# Patient Record
Sex: MALE | Race: WHITE | Employment: OTHER | URBAN - METROPOLITAN AREA
[De-identification: names, ages, dates, MRNs, and addresses within clinical notes are randomized per-mention and may not be internally consistent; named-entity substitution may affect disease eponyms.]

---

## 2017-01-23 ENCOUNTER — HOSPITAL ENCOUNTER (OUTPATIENT)
Age: 47
Discharge: HOME | End: 2017-01-23
Payer: COMMERCIAL

## 2017-01-23 VITALS
HEART RATE: 56 BPM | HEIGHT: 76 IN | TEMPERATURE: 98 F | DIASTOLIC BLOOD PRESSURE: 64 MMHG | RESPIRATION RATE: 14 BRPM | WEIGHT: 225 LBS | OXYGEN SATURATION: 100 % | SYSTOLIC BLOOD PRESSURE: 134 MMHG | BODY MASS INDEX: 27.4 KG/M2

## 2017-01-23 DIAGNOSIS — S51.812A FOREARM LACERATION, LEFT, INITIAL ENCOUNTER: Primary | ICD-10-CM

## 2017-01-23 PROCEDURE — 99202 OFFICE O/P NEW SF 15 MIN: CPT

## 2017-01-23 PROCEDURE — 12001 RPR S/N/AX/GEN/TRNK 2.5CM/<: CPT

## 2017-01-23 PROCEDURE — 99213 OFFICE O/P EST LOW 20 MIN: CPT

## 2017-01-23 NOTE — ED PROVIDER NOTES
Patient presents with:  Laceration Abrasion (integumentary)      HPI:     Regino Martinez is a 48year old male presents for a chief complaint of laceration evaluation and repair.   The patient cut his left anterior forearm with a razor blade while at wo FB:  Yes   Normal capillary refill distally:  Yes   Tendon / deep structures in tact:  Yes Against resistance. Anesthetic / Repair:  Copious wound care done. The wound was explored. No foreign body found.   The wound was anesthetized with 1% lidocaine

## 2017-01-31 ENCOUNTER — OFFICE VISIT (OUTPATIENT)
Dept: FAMILY MEDICINE CLINIC | Facility: CLINIC | Age: 47
End: 2017-01-31

## 2017-01-31 VITALS
DIASTOLIC BLOOD PRESSURE: 70 MMHG | HEART RATE: 60 BPM | BODY MASS INDEX: 28 KG/M2 | SYSTOLIC BLOOD PRESSURE: 133 MMHG | WEIGHT: 227 LBS

## 2017-01-31 DIAGNOSIS — S51.812D LACERATION OF FOREARM, LEFT, SUBSEQUENT ENCOUNTER: Primary | ICD-10-CM

## 2017-01-31 PROBLEM — S51.812A LACERATION OF FOREARM, LEFT: Status: ACTIVE | Noted: 2017-01-31

## 2017-01-31 PROCEDURE — 99212 OFFICE O/P EST SF 10 MIN: CPT | Performed by: FAMILY MEDICINE

## 2017-01-31 NOTE — PROGRESS NOTES
Blood pressure 133/70, pulse 60, weight 227 lb (102.967 kg). Following up for left forearm laceration. Occurred with a  at home. Healing well.       OBJECTIVE LEFT FOREARM HEALING LACERATION SUTURES INTACT NO DISCHARGE     ASSESSMENT LEFT Evelyn Champion

## 2017-02-03 ENCOUNTER — OFFICE VISIT (OUTPATIENT)
Dept: FAMILY MEDICINE CLINIC | Facility: CLINIC | Age: 47
End: 2017-02-03

## 2017-02-03 VITALS
SYSTOLIC BLOOD PRESSURE: 132 MMHG | DIASTOLIC BLOOD PRESSURE: 80 MMHG | HEART RATE: 54 BPM | BODY MASS INDEX: 28 KG/M2 | WEIGHT: 230 LBS

## 2017-02-03 DIAGNOSIS — S51.812D FOREARM LACERATION, LEFT, SUBSEQUENT ENCOUNTER: Primary | ICD-10-CM

## 2017-02-03 PROBLEM — S51.819A FOREARM LACERATION: Status: ACTIVE | Noted: 2017-02-03

## 2017-02-03 PROCEDURE — 99212 OFFICE O/P EST SF 10 MIN: CPT | Performed by: FAMILY MEDICINE

## 2017-02-03 PROCEDURE — 99213 OFFICE O/P EST LOW 20 MIN: CPT | Performed by: FAMILY MEDICINE

## 2017-02-03 NOTE — PROGRESS NOTES
Blood pressure 132/80, pulse 54, weight 230 lb (104.327 kg). Following up for suture removal they WERE placed 12 days ago. No pain.         Objective sutures intact wound without signs of secondary infection    Assessment laceration left forearm    Plan s

## 2017-04-05 ENCOUNTER — OFFICE VISIT (OUTPATIENT)
Dept: FAMILY MEDICINE CLINIC | Facility: CLINIC | Age: 47
End: 2017-04-05

## 2017-04-05 VITALS
HEART RATE: 67 BPM | WEIGHT: 223 LBS | BODY MASS INDEX: 27 KG/M2 | SYSTOLIC BLOOD PRESSURE: 125 MMHG | DIASTOLIC BLOOD PRESSURE: 70 MMHG

## 2017-04-05 DIAGNOSIS — B07.8 OTHER VIRAL WARTS: Primary | ICD-10-CM

## 2017-04-05 DIAGNOSIS — B35.4 TINEA CORPORIS: ICD-10-CM

## 2017-04-05 PROBLEM — B07.9 VIRAL WARTS: Status: ACTIVE | Noted: 2017-04-05

## 2017-04-05 PROCEDURE — 99213 OFFICE O/P EST LOW 20 MIN: CPT | Performed by: FAMILY MEDICINE

## 2017-04-05 PROCEDURE — 99212 OFFICE O/P EST SF 10 MIN: CPT | Performed by: FAMILY MEDICINE

## 2017-04-05 NOTE — PROGRESS NOTES
Blood pressure 125/70, pulse 67, weight 223 lb (101.152 kg). Patient presents today complaining of a wart on his cheek. He reports he has had it for 1 week. History of facial warts. Also complaining of a rash on his lower leg.   Objective papule noted l

## 2017-04-12 ENCOUNTER — TELEPHONE (OUTPATIENT)
Dept: FAMILY MEDICINE CLINIC | Facility: CLINIC | Age: 47
End: 2017-04-12

## 2017-04-12 ENCOUNTER — PATIENT MESSAGE (OUTPATIENT)
Dept: FAMILY MEDICINE CLINIC | Facility: CLINIC | Age: 47
End: 2017-04-12

## 2017-04-12 DIAGNOSIS — B07.9 VIRAL WARTS, UNSPECIFIED TYPE: Primary | ICD-10-CM

## 2017-04-12 DIAGNOSIS — L98.9 FACIAL LESION: Primary | ICD-10-CM

## 2017-04-12 NOTE — TELEPHONE ENCOUNTER
From: Grant Devine  To: Charley Key DO  Sent: 4/12/2017 11:51 AM CDT  Subject: Visit Sultana Judge,    The bump / wart on my face that you treated has not reduced in size.  I never saw the whitening then blackening of th

## 2017-04-12 NOTE — TELEPHONE ENCOUNTER
Regarding: Visit Follow-up Question  Contact: [phone]  ----- Message from More Morataya RN sent at 4/12/2017  1:06 PM CDT -----       ----- Message from Sophie Goss to Sohail Cao DO sent at 4/12/2017 11:51 AM -----   Hi Dr. Marimar Mena,

## 2017-04-26 ENCOUNTER — HOSPITAL ENCOUNTER (OUTPATIENT)
Dept: GENERAL RADIOLOGY | Age: 47
Discharge: HOME | End: 2017-04-26
Attending: FAMILY MEDICINE
Payer: COMMERCIAL

## 2017-04-26 ENCOUNTER — OFFICE VISIT (OUTPATIENT)
Dept: FAMILY MEDICINE CLINIC | Facility: CLINIC | Age: 47
End: 2017-04-26

## 2017-04-26 VITALS
WEIGHT: 219 LBS | BODY MASS INDEX: 26.67 KG/M2 | TEMPERATURE: 99 F | HEIGHT: 76 IN | SYSTOLIC BLOOD PRESSURE: 129 MMHG | HEART RATE: 69 BPM | DIASTOLIC BLOOD PRESSURE: 78 MMHG

## 2017-04-26 DIAGNOSIS — M25.361 KNEE INSTABILITY, RIGHT: ICD-10-CM

## 2017-04-26 DIAGNOSIS — M25.361 KNEE INSTABILITY, RIGHT: Primary | ICD-10-CM

## 2017-04-26 PROCEDURE — 99213 OFFICE O/P EST LOW 20 MIN: CPT | Performed by: FAMILY MEDICINE

## 2017-04-26 PROCEDURE — 99212 OFFICE O/P EST SF 10 MIN: CPT | Performed by: FAMILY MEDICINE

## 2017-04-26 PROCEDURE — 73562 X-RAY EXAM OF KNEE 3: CPT

## 2017-04-26 RX ORDER — CLOTRIMAZOLE AND BETAMETHASONE DIPROPIONATE 10; .64 MG/G; MG/G
CREAM TOPICAL
Refills: 1 | COMMUNITY
Start: 2017-04-18 | End: 2017-11-28

## 2017-04-26 NOTE — PROGRESS NOTES
Blood pressure 129/78, pulse 69, temperature 98.5 °F (36.9 °C), temperature source Oral, height 6' 4\" (1.93 m), weight 219 lb (99.338 kg). Patient presents today status post right knee injury 10 days ago playing hockey.   He has a remote history of ACL montano

## 2017-05-15 ENCOUNTER — OFFICE VISIT (OUTPATIENT)
Dept: ORTHOPEDICS CLINIC | Facility: CLINIC | Age: 47
End: 2017-05-15

## 2017-05-15 DIAGNOSIS — M25.561 ACUTE PAIN OF RIGHT KNEE: Primary | ICD-10-CM

## 2017-05-15 PROCEDURE — 99212 OFFICE O/P EST SF 10 MIN: CPT | Performed by: ORTHOPAEDIC SURGERY

## 2017-05-15 PROCEDURE — 99243 OFF/OP CNSLTJ NEW/EST LOW 30: CPT | Performed by: ORTHOPAEDIC SURGERY

## 2017-05-15 NOTE — H&P
Chief Complaint: Right knee pain    NURSING INTAKE COMMENTS: Patient presents with:  Consult: Referred by Dr. Teena Mcdonald. Pt injuries knee right knee playing hockey. Pt did have an ACL surgery to the right knee Partial lateral meniscus. 1997.   Injury appen Past Surgical History    OTHER SURGICAL HISTORY      Comment Nasal polyp removal    OTHER SURGICAL HISTORY      Comment RIGHT ACL/MCL REPAIR      Family History   Problem Relation Age of Onset   • Hypertension Father    • Christa Aschoff Father long-term since his original ACL surgery    Radiographs and Imaging: Reviewed. Prior hardware from the ACL reconstruction is seen. Some mild osteoarthritic changes are noted. No acute abnormality is seen.     Bettyjane Dakin was seen today for consult and knee p

## 2017-11-28 ENCOUNTER — OFFICE VISIT (OUTPATIENT)
Dept: FAMILY MEDICINE CLINIC | Facility: CLINIC | Age: 47
End: 2017-11-28

## 2017-11-28 VITALS
RESPIRATION RATE: 18 BRPM | DIASTOLIC BLOOD PRESSURE: 73 MMHG | HEIGHT: 76 IN | TEMPERATURE: 98 F | WEIGHT: 226 LBS | HEART RATE: 63 BPM | BODY MASS INDEX: 27.52 KG/M2 | SYSTOLIC BLOOD PRESSURE: 120 MMHG

## 2017-11-28 DIAGNOSIS — J06.9 VIRAL UPPER RESPIRATORY TRACT INFECTION: Primary | ICD-10-CM

## 2017-11-28 PROCEDURE — 99213 OFFICE O/P EST LOW 20 MIN: CPT | Performed by: FAMILY MEDICINE

## 2017-11-28 PROCEDURE — 99212 OFFICE O/P EST SF 10 MIN: CPT | Performed by: FAMILY MEDICINE

## 2017-11-29 NOTE — PROGRESS NOTES
Blood pressure 120/73, pulse 63, temperature 97.7 °F (36.5 °C), temperature source Oral, resp. rate 18, height 6' 4\" (1.93 m), weight 226 lb (102.5 kg). Complaining of  cough for the past 3 weeks phlegm is dry to clear.   No nasal congestion no nocturna

## 2017-12-01 ENCOUNTER — OFFICE VISIT (OUTPATIENT)
Dept: FAMILY MEDICINE CLINIC | Facility: CLINIC | Age: 47
End: 2017-12-01

## 2017-12-01 ENCOUNTER — APPOINTMENT (OUTPATIENT)
Dept: LAB | Age: 47
End: 2017-12-01
Attending: FAMILY MEDICINE
Payer: COMMERCIAL

## 2017-12-01 VITALS
SYSTOLIC BLOOD PRESSURE: 134 MMHG | BODY MASS INDEX: 27.28 KG/M2 | DIASTOLIC BLOOD PRESSURE: 75 MMHG | TEMPERATURE: 98 F | WEIGHT: 224 LBS | HEIGHT: 76 IN | RESPIRATION RATE: 18 BRPM | HEART RATE: 65 BPM

## 2017-12-01 DIAGNOSIS — N48.9 PENILE LESION: ICD-10-CM

## 2017-12-01 DIAGNOSIS — N48.9 PENILE LESION: Primary | ICD-10-CM

## 2017-12-01 PROCEDURE — 86803 HEPATITIS C AB TEST: CPT

## 2017-12-01 PROCEDURE — 99213 OFFICE O/P EST LOW 20 MIN: CPT | Performed by: FAMILY MEDICINE

## 2017-12-01 PROCEDURE — 87086 URINE CULTURE/COLONY COUNT: CPT

## 2017-12-01 PROCEDURE — 99212 OFFICE O/P EST SF 10 MIN: CPT | Performed by: FAMILY MEDICINE

## 2017-12-01 PROCEDURE — 87591 N.GONORRHOEAE DNA AMP PROB: CPT

## 2017-12-01 PROCEDURE — 87491 CHLMYD TRACH DNA AMP PROBE: CPT

## 2017-12-01 PROCEDURE — 36415 COLL VENOUS BLD VENIPUNCTURE: CPT

## 2017-12-01 PROCEDURE — 86706 HEP B SURFACE ANTIBODY: CPT

## 2017-12-01 PROCEDURE — 80500 HEPATITIS A B + C PROFILE: CPT

## 2017-12-01 PROCEDURE — 86708 HEPATITIS A ANTIBODY: CPT

## 2017-12-01 PROCEDURE — 81003 URINALYSIS AUTO W/O SCOPE: CPT

## 2017-12-01 PROCEDURE — 86780 TREPONEMA PALLIDUM: CPT

## 2017-12-01 PROCEDURE — 87340 HEPATITIS B SURFACE AG IA: CPT

## 2017-12-01 PROCEDURE — 86704 HEP B CORE ANTIBODY TOTAL: CPT

## 2017-12-05 ENCOUNTER — TELEPHONE (OUTPATIENT)
Dept: FAMILY MEDICINE CLINIC | Facility: CLINIC | Age: 47
End: 2017-12-05

## 2017-12-05 NOTE — TELEPHONE ENCOUNTER
, Pt is returning back you call. Please advise. Notes Recorded by Angela Webber DO on 12/5/2017 at 2:57 PM CST  Called regarding results lmtcb.

## 2017-12-06 NOTE — TELEPHONE ENCOUNTER
CALLED AND REVIEWED RESULTS OF HERPES SWAB ADVISED PATIENT OF NEGATIVE RESULTS BUT CLINICALLY PATIENT LIKELY TO HAVE HERPES ADVISED INFORMING ANY POTENTIAL PARTNERS AND DISCUSSED OTHER STI RESULTS.

## 2018-01-03 ENCOUNTER — OFFICE VISIT (OUTPATIENT)
Dept: FAMILY MEDICINE CLINIC | Facility: CLINIC | Age: 48
End: 2018-01-03

## 2018-01-03 ENCOUNTER — LAB ENCOUNTER (OUTPATIENT)
Dept: LAB | Age: 48
End: 2018-01-03
Attending: FAMILY MEDICINE
Payer: COMMERCIAL

## 2018-01-03 VITALS
DIASTOLIC BLOOD PRESSURE: 76 MMHG | BODY MASS INDEX: 28.15 KG/M2 | SYSTOLIC BLOOD PRESSURE: 117 MMHG | WEIGHT: 231.13 LBS | HEART RATE: 80 BPM | HEIGHT: 76 IN

## 2018-01-03 DIAGNOSIS — A51.0 CHANCRE: ICD-10-CM

## 2018-01-03 DIAGNOSIS — A51.0 CHANCRE: Primary | ICD-10-CM

## 2018-01-03 PROCEDURE — 86780 TREPONEMA PALLIDUM: CPT

## 2018-01-03 PROCEDURE — 86593 SYPHILIS TEST NON-TREP QUANT: CPT

## 2018-01-03 PROCEDURE — 36415 COLL VENOUS BLD VENIPUNCTURE: CPT

## 2018-01-03 PROCEDURE — 96372 THER/PROPH/DIAG INJ SC/IM: CPT | Performed by: FAMILY MEDICINE

## 2018-01-03 PROCEDURE — 99214 OFFICE O/P EST MOD 30 MIN: CPT | Performed by: FAMILY MEDICINE

## 2018-01-03 PROCEDURE — 86592 SYPHILIS TEST NON-TREP QUAL: CPT

## 2018-01-03 NOTE — PROGRESS NOTES
Blood pressure 117/76, pulse 80, height 6' 4\" (1.93 m), weight 231 lb 1.6 oz (104.8 kg). PRESENTS TODAY FOLLOWING UP FOR PAINLESS PENILE LESION PRESENT NEAR HIS URETHRA. HISTORY OF POSITIVE HSV 1 BLOOD TEST YEARS AGO. LESION NOW PRESENT FOR 5 WEEKS.

## 2018-01-05 ENCOUNTER — TELEPHONE (OUTPATIENT)
Dept: FAMILY MEDICINE CLINIC | Facility: CLINIC | Age: 48
End: 2018-01-05

## 2018-01-05 LAB — T PALLIDUM AB SER QL: POSITIVE

## 2018-01-05 NOTE — TELEPHONE ENCOUNTER
CALLED AND DISCUSSED TPAL RESULTS PATIENT ADMITS TO SAME SEX ACTIVITY NO DRUGS OR PROSTITUTES. RASH HAS NOT WORSENED SINCE VISIT.

## 2018-01-07 LAB — RAPID PLASMA REAGIN (RPR): REACTIVE

## 2018-01-09 ENCOUNTER — TELEPHONE (OUTPATIENT)
Dept: OTHER | Age: 48
End: 2018-01-09

## 2018-01-09 NOTE — TELEPHONE ENCOUNTER
To  [facility],   Please see message below and advise.      Please respond to pool: EM FM LMB LPN/CMA    Patient called back and states that he is returning a call from last night call of Dr Salomón Gonzalez, advised that MD send MyChart message as well last night and

## 2018-02-20 ENCOUNTER — OFFICE VISIT (OUTPATIENT)
Dept: FAMILY MEDICINE CLINIC | Facility: CLINIC | Age: 48
End: 2018-02-20

## 2018-02-20 VITALS
HEART RATE: 71 BPM | WEIGHT: 226 LBS | DIASTOLIC BLOOD PRESSURE: 77 MMHG | HEIGHT: 76 IN | SYSTOLIC BLOOD PRESSURE: 151 MMHG | BODY MASS INDEX: 27.52 KG/M2

## 2018-02-20 DIAGNOSIS — N48.9 PENILE LESION: Primary | ICD-10-CM

## 2018-02-20 DIAGNOSIS — R35.1 NOCTURIA: ICD-10-CM

## 2018-02-20 PROCEDURE — 99213 OFFICE O/P EST LOW 20 MIN: CPT | Performed by: FAMILY MEDICINE

## 2018-02-20 PROCEDURE — 99212 OFFICE O/P EST SF 10 MIN: CPT | Performed by: FAMILY MEDICINE

## 2018-02-20 RX ORDER — KETOCONAZOLE 20 MG/G
1 CREAM TOPICAL DAILY
Qty: 1 TUBE | Refills: 1 | Status: SHIPPED | OUTPATIENT
Start: 2018-02-20 | End: 2018-05-15

## 2018-02-20 NOTE — PROGRESS NOTES
Blood pressure 151/77, pulse 71, height 6' 4\" (1.93 m), weight 226 lb (102.5 kg). Patient has a penile lesion that he has not noticed for 2 weeks. Received oral sex the night before he noticed the lesion. It is tender but not painful.   Denies dischar

## 2018-02-22 ENCOUNTER — LAB ENCOUNTER (OUTPATIENT)
Dept: LAB | Age: 48
End: 2018-02-22
Attending: FAMILY MEDICINE
Payer: COMMERCIAL

## 2018-02-22 ENCOUNTER — OFFICE VISIT (OUTPATIENT)
Dept: PODIATRY CLINIC | Facility: CLINIC | Age: 48
End: 2018-02-22

## 2018-02-22 DIAGNOSIS — N48.9 PENILE LESION: Primary | ICD-10-CM

## 2018-02-22 DIAGNOSIS — M77.42 METATARSALGIA OF LEFT FOOT: Primary | ICD-10-CM

## 2018-02-22 DIAGNOSIS — R35.1 NOCTURIA: ICD-10-CM

## 2018-02-22 LAB
CHOLEST SERPL-MCNC: 150 MG/DL (ref 110–200)
GLUCOSE SERPL-MCNC: 100 MG/DL (ref 70–99)
HDLC SERPL-MCNC: 43 MG/DL
LDLC SERPL CALC-MCNC: 95 MG/DL (ref 0–99)
NONHDLC SERPL-MCNC: 107 MG/DL
PSA SERPL-MCNC: 1 NG/ML (ref 0–4)
TRIGL SERPL-MCNC: 62 MG/DL (ref 1–149)

## 2018-02-22 PROCEDURE — 80061 LIPID PANEL: CPT

## 2018-02-22 PROCEDURE — 99213 OFFICE O/P EST LOW 20 MIN: CPT | Performed by: PODIATRIST

## 2018-02-22 PROCEDURE — 86592 SYPHILIS TEST NON-TREP QUAL: CPT

## 2018-02-22 PROCEDURE — 86593 SYPHILIS TEST NON-TREP QUANT: CPT

## 2018-02-22 PROCEDURE — 99212 OFFICE O/P EST SF 10 MIN: CPT | Performed by: PODIATRIST

## 2018-02-22 PROCEDURE — 86780 TREPONEMA PALLIDUM: CPT

## 2018-02-22 PROCEDURE — 82947 ASSAY GLUCOSE BLOOD QUANT: CPT

## 2018-02-22 PROCEDURE — 36415 COLL VENOUS BLD VENIPUNCTURE: CPT

## 2018-02-22 NOTE — PROGRESS NOTES
HPI:    Patient ID: Mallory Evans is a 46year old male. HPI  This pleasant 78-year-old male presents as a new patient to me.   He was a patient of Dr. Darlene Izquierdo in the past.  Patient has been wearing an orthotic device for the last couple of years be specific osseous pathology. There is no noted significant prominence of the metatarsal phalangeal joint. I instructed this patient that I did not correlate this left forefoot symptoms with heels. He has never had heel pain nor has he ever had arch pain.

## 2018-02-23 LAB — T PALLIDUM AB SER QL: POSITIVE

## 2018-02-25 LAB — RAPID PLASMA REAGIN (RPR): REACTIVE

## 2018-03-22 ENCOUNTER — OFFICE VISIT (OUTPATIENT)
Dept: SURGERY | Facility: CLINIC | Age: 48
End: 2018-03-22

## 2018-03-22 VITALS
HEART RATE: 71 BPM | BODY MASS INDEX: 27.4 KG/M2 | DIASTOLIC BLOOD PRESSURE: 87 MMHG | WEIGHT: 225 LBS | TEMPERATURE: 98 F | SYSTOLIC BLOOD PRESSURE: 148 MMHG | HEIGHT: 76 IN

## 2018-03-22 DIAGNOSIS — N48.9 PENILE LESION: Primary | ICD-10-CM

## 2018-03-22 PROCEDURE — 99212 OFFICE O/P EST SF 10 MIN: CPT | Performed by: UROLOGY

## 2018-03-22 PROCEDURE — 99244 OFF/OP CNSLTJ NEW/EST MOD 40: CPT | Performed by: UROLOGY

## 2018-03-22 RX ORDER — DOXYCYCLINE 100 MG/1
100 TABLET ORAL 2 TIMES DAILY
Qty: 28 TABLET | Refills: 0 | Status: SHIPPED | OUTPATIENT
Start: 2018-03-22 | End: 2018-04-05

## 2018-03-22 NOTE — PROGRESS NOTES
SUBJECTIVE:  Sandrita Arzola is a 46year old male who presents for a consultation at the request of, and a copy of this note will be sent to, Dr. Mu Smith, for evaluation of  Penile lesion. He states that the problem is unchanged.  Symptoms inclu Jany Client Mother      Lymphoma   • Colon Cancer Other      Close relative      Social History: Smoking status: Never Smoker                                                              Smokeless tobacco: Never Used                      Alcohol use:  Yes lesion  (primary encounter diagnosis)    No orders of the defined types were placed in this encounter. Reviewed findings with patient.   No signs of latent or tertiary syphilis can be seen in physical exam if there is no other skin lesions adenopathy or ot

## 2018-03-29 ENCOUNTER — TELEPHONE (OUTPATIENT)
Dept: PODIATRY CLINIC | Facility: CLINIC | Age: 48
End: 2018-03-29

## 2018-03-29 ENCOUNTER — OFFICE VISIT (OUTPATIENT)
Dept: PODIATRY CLINIC | Facility: CLINIC | Age: 48
End: 2018-03-29

## 2018-03-29 DIAGNOSIS — M77.42 METATARSALGIA OF LEFT FOOT: Primary | ICD-10-CM

## 2018-03-29 PROCEDURE — 29799 UNLISTED PX CASTING/STRPG: CPT | Performed by: PODIATRIST

## 2018-03-29 PROCEDURE — 99213 OFFICE O/P EST LOW 20 MIN: CPT | Performed by: PODIATRIST

## 2018-03-29 PROCEDURE — L3000 FT INSERT UCB BERKELEY SHELL: HCPCS | Performed by: PODIATRIST

## 2018-03-29 PROCEDURE — 99212 OFFICE O/P EST SF 10 MIN: CPT | Performed by: PODIATRIST

## 2018-03-29 NOTE — TELEPHONE ENCOUNTER
Impression taken today for custom orthotics. Orthotics picked up at [facility] on 5/15/18.  Lisa Curry

## 2018-03-29 NOTE — PROGRESS NOTES
HPI:    Patient ID: Gertrude Ling is a 46year old male. HPI  This 78-year-old male presents for long-term orthotic control. Approval has been received for this treatment.   Review of Systems         Current Outpatient Prescriptions:  Doxycycline M

## 2018-04-06 ENCOUNTER — LAB ENCOUNTER (OUTPATIENT)
Dept: LAB | Age: 48
End: 2018-04-06
Attending: UROLOGY
Payer: COMMERCIAL

## 2018-04-06 DIAGNOSIS — N48.9 PENILE LESION: ICD-10-CM

## 2018-04-06 PROCEDURE — 36415 COLL VENOUS BLD VENIPUNCTURE: CPT

## 2018-04-06 PROCEDURE — 86592 SYPHILIS TEST NON-TREP QUAL: CPT

## 2018-04-06 PROCEDURE — 86593 SYPHILIS TEST NON-TREP QUANT: CPT

## 2018-04-06 PROCEDURE — 86780 TREPONEMA PALLIDUM: CPT

## 2018-04-12 ENCOUNTER — OFFICE VISIT (OUTPATIENT)
Dept: SURGERY | Facility: CLINIC | Age: 48
End: 2018-04-12

## 2018-04-12 VITALS
SYSTOLIC BLOOD PRESSURE: 156 MMHG | RESPIRATION RATE: 16 BRPM | BODY MASS INDEX: 26.79 KG/M2 | HEART RATE: 64 BPM | DIASTOLIC BLOOD PRESSURE: 86 MMHG | HEIGHT: 76 IN | TEMPERATURE: 98 F | WEIGHT: 220 LBS

## 2018-04-12 DIAGNOSIS — N48.9 PENILE LESION: Primary | ICD-10-CM

## 2018-04-12 PROCEDURE — 99213 OFFICE O/P EST LOW 20 MIN: CPT | Performed by: UROLOGY

## 2018-04-12 PROCEDURE — 99212 OFFICE O/P EST SF 10 MIN: CPT | Performed by: UROLOGY

## 2018-04-13 NOTE — PROGRESS NOTES
Sophie Goss is a 46year old male. HPI:   No chief complaint on file. 49-year-old male in follow-up to a visit March 22, 2018.   He has a history of syphilis for which he was appropriately treated with intramuscular Bicillin by his primary ca Smoking status: Never Smoker                                                              Smokeless tobacco: Never Used                      Alcohol use: Yes           6.0 oz/week     Standard drinks or equivalent: 10 per week     Comment: occasional

## 2018-04-20 ENCOUNTER — TELEPHONE (OUTPATIENT)
Dept: ORTHOPEDICS CLINIC | Facility: CLINIC | Age: 48
End: 2018-04-20

## 2018-05-15 ENCOUNTER — OFFICE VISIT (OUTPATIENT)
Dept: PODIATRY CLINIC | Facility: CLINIC | Age: 48
End: 2018-05-15

## 2018-05-15 DIAGNOSIS — M77.42 METATARSALGIA OF LEFT FOOT: Primary | ICD-10-CM

## 2018-05-15 NOTE — PROGRESS NOTES
HPI:    Patient ID: Anthony Garnett is a 46year old male. HPI  This 75-year-old male presents for dispensing of orthoses. They appear to be of proper fit. I do not see reason for break-in.   Based on the fact that he has had a device in the past.

## 2018-06-19 ENCOUNTER — OFFICE VISIT (OUTPATIENT)
Dept: PODIATRY CLINIC | Facility: CLINIC | Age: 48
End: 2018-06-19

## 2018-06-19 DIAGNOSIS — M77.42 METATARSALGIA OF LEFT FOOT: ICD-10-CM

## 2018-06-19 DIAGNOSIS — B35.1 ONYCHOMYCOSIS: Primary | ICD-10-CM

## 2018-06-19 PROCEDURE — 99212 OFFICE O/P EST SF 10 MIN: CPT | Performed by: PODIATRIST

## 2018-06-19 PROCEDURE — 99213 OFFICE O/P EST LOW 20 MIN: CPT | Performed by: PODIATRIST

## 2018-06-19 NOTE — PROGRESS NOTES
HPI:    Patient ID: Brendan Luke is a 46year old male. HPI  This 61-year-old male presents primarily for reevaluation in reference to orthotic use.   The new orthotic devices are functioning very well and is extremely happy and pleased with the wa & Referrals:  None       #[id number]

## 2018-07-12 ENCOUNTER — APPOINTMENT (OUTPATIENT)
Dept: LAB | Age: 48
End: 2018-07-12
Attending: PODIATRIST
Payer: COMMERCIAL

## 2018-07-12 ENCOUNTER — OFFICE VISIT (OUTPATIENT)
Dept: PODIATRY CLINIC | Facility: CLINIC | Age: 48
End: 2018-07-12

## 2018-07-12 DIAGNOSIS — B35.1 ONYCHOMYCOSIS: Primary | ICD-10-CM

## 2018-07-12 DIAGNOSIS — B35.1 ONYCHOMYCOSIS: ICD-10-CM

## 2018-07-12 LAB
ALBUMIN SERPL BCP-MCNC: 3.8 G/DL (ref 3.5–4.8)
ALP SERPL-CCNC: 50 U/L (ref 32–100)
ALT SERPL-CCNC: 25 U/L (ref 17–63)
AST SERPL-CCNC: 28 U/L (ref 15–41)
BILIRUB DIRECT SERPL-MCNC: 0.1 MG/DL (ref 0–0.2)
BILIRUB SERPL-MCNC: 0.8 MG/DL (ref 0.3–1.2)
PROT SERPL-MCNC: 6.8 G/DL (ref 5.9–8.4)

## 2018-07-12 PROCEDURE — 99212 OFFICE O/P EST SF 10 MIN: CPT | Performed by: PODIATRIST

## 2018-07-12 PROCEDURE — 80076 HEPATIC FUNCTION PANEL: CPT

## 2018-07-12 PROCEDURE — 36415 COLL VENOUS BLD VENIPUNCTURE: CPT

## 2018-07-12 NOTE — PROGRESS NOTES
HPI:    Patient ID: Arnel Hernandez is a 46year old male. HPI  This 57-year-old male presents to review fungal culture and decide on treatment. Review of Systems  I reviewed medical status and medications were noted. He has no known allergies.

## 2018-07-13 ENCOUNTER — TELEPHONE (OUTPATIENT)
Dept: PODIATRY CLINIC | Facility: CLINIC | Age: 48
End: 2018-07-13

## 2018-07-13 RX ORDER — TERBINAFINE HYDROCHLORIDE 250 MG/1
250 TABLET ORAL DAILY
Qty: 90 TABLET | Refills: 0 | Status: SHIPPED | OUTPATIENT
Start: 2018-07-13 | End: 2019-08-12 | Stop reason: ALTCHOICE

## 2018-07-13 NOTE — TELEPHONE ENCOUNTER
----- Message from Fabricio Coronado DPM sent at 7/13/2018  9:55 AM CDT -----  1. Call patient  2. Labs wnl  3. Rx Lamisil 250 mg tabs             #90         Sig 1 tab daily       Generic ok nrf  4.  Follow up 2 months

## 2018-07-13 NOTE — TELEPHONE ENCOUNTER
Called pt and informed him of SCR orders/message. F/u appt made on 9/6/18 @ 140pm at [facility]. Pharm confirmed.

## 2018-08-07 ENCOUNTER — LAB ENCOUNTER (OUTPATIENT)
Dept: LAB | Age: 48
End: 2018-08-07
Attending: UROLOGY
Payer: COMMERCIAL

## 2018-08-07 DIAGNOSIS — N48.9 PENILE LESION: ICD-10-CM

## 2018-08-07 PROCEDURE — 86780 TREPONEMA PALLIDUM: CPT

## 2018-08-07 PROCEDURE — 36415 COLL VENOUS BLD VENIPUNCTURE: CPT

## 2018-08-07 PROCEDURE — 86593 SYPHILIS TEST NON-TREP QUANT: CPT

## 2018-08-07 PROCEDURE — 86592 SYPHILIS TEST NON-TREP QUAL: CPT

## 2018-08-08 LAB — T PALLIDUM AB SER QL: POSITIVE

## 2018-08-10 LAB — RAPID PLASMA REAGIN (RPR): REACTIVE

## 2018-08-22 ENCOUNTER — TELEPHONE (OUTPATIENT)
Dept: SURGERY | Facility: CLINIC | Age: 48
End: 2018-08-22

## 2018-08-22 NOTE — TELEPHONE ENCOUNTER
Patient contacted. Patient is aware of his test results and verbalized understanding. Number to [facility] Infectious disease given to patient. Patient denies any syphilis symptoms.

## 2018-08-22 NOTE — TELEPHONE ENCOUNTER
----- Message from Ziggy Salazar MD sent at 8/21/2018  3:48 PM CDT -----  Staff please call this patient. Informed him that I have reviewed his recent blood tests.   He continues to have a 1: 2 RPR titer even though my suspicion was he would seroconvert a

## 2018-09-06 ENCOUNTER — OFFICE VISIT (OUTPATIENT)
Dept: PODIATRY CLINIC | Facility: CLINIC | Age: 48
End: 2018-09-06
Payer: COMMERCIAL

## 2018-09-06 DIAGNOSIS — B35.1 ONYCHOMYCOSIS: Primary | ICD-10-CM

## 2018-09-06 PROCEDURE — 99212 OFFICE O/P EST SF 10 MIN: CPT | Performed by: PODIATRIST

## 2018-09-06 NOTE — PROGRESS NOTES
HPI:    Patient ID: Anaya Galvez is a 46year old male. HPI  This 49-year-old male presents for follow-up in reference to treating onychomycosis. Patient has completed 60 tablets of oral Lamisil with no apparent side effects.   Nail changes noted

## 2018-11-06 ENCOUNTER — HOSPITAL ENCOUNTER (OUTPATIENT)
Dept: GENERAL RADIOLOGY | Age: 48
Discharge: HOME | End: 2018-11-06
Attending: FAMILY MEDICINE
Payer: COMMERCIAL

## 2018-11-06 ENCOUNTER — OFFICE VISIT (OUTPATIENT)
Dept: FAMILY MEDICINE CLINIC | Facility: CLINIC | Age: 48
End: 2018-11-06
Payer: COMMERCIAL

## 2018-11-06 VITALS
RESPIRATION RATE: 20 BRPM | SYSTOLIC BLOOD PRESSURE: 134 MMHG | DIASTOLIC BLOOD PRESSURE: 83 MMHG | BODY MASS INDEX: 27.16 KG/M2 | WEIGHT: 223 LBS | HEART RATE: 76 BPM | HEIGHT: 76 IN

## 2018-11-06 DIAGNOSIS — M54.12 CERVICAL RADICULOPATHY: ICD-10-CM

## 2018-11-06 DIAGNOSIS — Z12.11 ENCOUNTER FOR SCREENING COLONOSCOPY: Primary | ICD-10-CM

## 2018-11-06 PROCEDURE — 99212 OFFICE O/P EST SF 10 MIN: CPT | Performed by: FAMILY MEDICINE

## 2018-11-06 PROCEDURE — 72040 X-RAY EXAM NECK SPINE 2-3 VW: CPT | Performed by: FAMILY MEDICINE

## 2018-11-06 PROCEDURE — 99213 OFFICE O/P EST LOW 20 MIN: CPT | Performed by: FAMILY MEDICINE

## 2018-11-06 RX ORDER — TADALAFIL 20 MG/1
20 TABLET ORAL
Qty: 36 TABLET | Refills: 3 | Status: SHIPPED | OUTPATIENT
Start: 2018-11-06

## 2018-11-06 RX ORDER — METHYLPREDNISOLONE 4 MG/1
TABLET ORAL
Qty: 1 KIT | Refills: 1 | Status: SHIPPED | OUTPATIENT
Start: 2018-11-06 | End: 2019-08-12 | Stop reason: ALTCHOICE

## 2018-11-06 NOTE — PROGRESS NOTES
Blood pressure 134/83, pulse 76, resp. rate 20, height 6' 4\" (1.93 m), weight 223 lb (101.2 kg). Patient presents today complaining of discomfort down the left arm for the past month. No traumatic injury he lifts weights and plays hockey.   He has a li

## 2018-11-13 ENCOUNTER — OFFICE VISIT (OUTPATIENT)
Dept: PHYSICAL THERAPY | Age: 48
End: 2018-11-13
Attending: FAMILY MEDICINE
Payer: COMMERCIAL

## 2018-11-13 DIAGNOSIS — M54.12 CERVICAL RADICULOPATHY: ICD-10-CM

## 2018-11-13 PROCEDURE — 97161 PT EVAL LOW COMPLEX 20 MIN: CPT | Performed by: PHYSICAL THERAPIST

## 2018-11-13 PROCEDURE — 97530 THERAPEUTIC ACTIVITIES: CPT | Performed by: PHYSICAL THERAPIST

## 2018-11-13 NOTE — PROGRESS NOTES
CERVICAL SPINE EVALUATION:   Referring Physician: Nik Plunkett DO    Diagnosis: Cervical radiculopathy (T10.17) Evaluation date: 11/13/2018   Date of Onset: October 2018  Visit # 1  Scheduled Visits 8  Insurance Authorized visits 10 [address]  Seated Retraction with self OP      Shoulder AROM: all WFL     Strength UE:   -/5    R  L   Shoulder flex (C4)    5/5   5/5   Shoulder ext     Abduction (C5) 5/5 5/5   ER 5/5 5/5   IR 5/5 5/5   Biceps (C6) 5/5 5/5   Wrist ext (C6)  5/5   Triceps (C7) 5 please contact me at Dept: [phone].     Sincerely,  Electronically signed by therapist: Jose C Gallagher PT, DPT, cert MDT      Physician’s certification required: Yes  I certify the need for these services furnished under this plan of treatment and

## 2018-11-15 ENCOUNTER — OFFICE VISIT (OUTPATIENT)
Dept: PHYSICAL THERAPY | Age: 48
End: 2018-11-15
Attending: FAMILY MEDICINE
Payer: COMMERCIAL

## 2018-11-15 PROCEDURE — 97110 THERAPEUTIC EXERCISES: CPT | Performed by: PHYSICAL THERAPIST

## 2018-11-15 NOTE — PROGRESS NOTES
Dx: Cervical radiculopathy (M54.12)           Visit # 2  Fall Risk: standard     Scheduled Visits 8  Precautions: n/a   Insurance Authorized visits      10 PPO    Next MD visit: none scheduled  Evaluation Date 11/13/18  Medication Changes since last visit?

## 2018-11-19 ENCOUNTER — OFFICE VISIT (OUTPATIENT)
Dept: PHYSICAL THERAPY | Age: 48
End: 2018-11-19
Attending: FAMILY MEDICINE
Payer: COMMERCIAL

## 2018-11-19 PROCEDURE — 97110 THERAPEUTIC EXERCISES: CPT

## 2018-11-19 NOTE — PROGRESS NOTES
Dx: Cervical radiculopathy (M54.12)           Visit # 3  Fall Risk: standard     Scheduled Visits 8  Precautions: n/a   Insurance Authorized visits      10 PPO    Next MD visit: none scheduled  Evaluation Date 11/13/18  Medication Changes since last visit? drive > 20 minutes with < 2/10 pain.   3.Pt to demonstrate [facility] C-spine AROM in order to turn head to drive-almost met            Plan: Cont PT per plan of care    Charges: 3 therex       Total Timed Treatment: 40 min  Total Treatment Time: 45 min

## 2018-11-20 ENCOUNTER — APPOINTMENT (OUTPATIENT)
Dept: PHYSICAL THERAPY | Age: 48
End: 2018-11-20
Attending: FAMILY MEDICINE
Payer: COMMERCIAL

## 2018-11-26 ENCOUNTER — APPOINTMENT (OUTPATIENT)
Dept: PHYSICAL THERAPY | Age: 48
End: 2018-11-26
Attending: FAMILY MEDICINE
Payer: COMMERCIAL

## 2018-11-29 ENCOUNTER — APPOINTMENT (OUTPATIENT)
Dept: PHYSICAL THERAPY | Age: 48
End: 2018-11-29
Attending: FAMILY MEDICINE
Payer: COMMERCIAL

## 2018-12-04 ENCOUNTER — APPOINTMENT (OUTPATIENT)
Dept: PHYSICAL THERAPY | Age: 48
End: 2018-12-04
Attending: FAMILY MEDICINE
Payer: COMMERCIAL

## 2018-12-05 NOTE — PROGRESS NOTES
Patient Name: Linda Jackson, : 1970, MRN: [id number]   Date:  2018  Referring Physician:  Bridget Shipman    Diagnosis: Cervical radiculopathy (M54.12)         Discharge note    Pt has attended 3 visits in Physical Therapy.      Feliciano

## 2018-12-06 ENCOUNTER — OFFICE VISIT (OUTPATIENT)
Dept: PODIATRY CLINIC | Facility: CLINIC | Age: 48
End: 2018-12-06
Payer: COMMERCIAL

## 2018-12-06 DIAGNOSIS — B35.1 ONYCHOMYCOSIS: Primary | ICD-10-CM

## 2018-12-06 PROCEDURE — 99212 OFFICE O/P EST SF 10 MIN: CPT | Performed by: PODIATRIST

## 2018-12-06 NOTE — PROGRESS NOTES
HPI:    Patient ID: Sophie Goss is a 46year old male. 24-year-old male presents for follow-up in reference to fungus toenail concerns. He finished 90 tablets of oral Lamisil 2 months ago. He reports no ill effects with the medication.     ROS:

## 2018-12-07 ENCOUNTER — APPOINTMENT (OUTPATIENT)
Dept: PHYSICAL THERAPY | Age: 48
End: 2018-12-07
Attending: FAMILY MEDICINE
Payer: COMMERCIAL

## 2018-12-11 ENCOUNTER — APPOINTMENT (OUTPATIENT)
Dept: PHYSICAL THERAPY | Age: 48
End: 2018-12-11
Attending: FAMILY MEDICINE
Payer: COMMERCIAL

## 2018-12-14 ENCOUNTER — APPOINTMENT (OUTPATIENT)
Dept: PHYSICAL THERAPY | Age: 48
End: 2018-12-14
Attending: FAMILY MEDICINE
Payer: COMMERCIAL

## 2018-12-18 ENCOUNTER — APPOINTMENT (OUTPATIENT)
Dept: PHYSICAL THERAPY | Age: 48
End: 2018-12-18
Attending: FAMILY MEDICINE
Payer: COMMERCIAL

## 2018-12-21 ENCOUNTER — APPOINTMENT (OUTPATIENT)
Dept: PHYSICAL THERAPY | Age: 48
End: 2018-12-21
Attending: FAMILY MEDICINE
Payer: COMMERCIAL

## 2019-08-12 ENCOUNTER — NURSE ONLY (OUTPATIENT)
Dept: GASTROENTEROLOGY | Facility: CLINIC | Age: 49
End: 2019-08-12

## 2019-08-12 DIAGNOSIS — Z80.0 FAMILY HISTORY OF COLON CANCER: ICD-10-CM

## 2019-08-12 DIAGNOSIS — Z12.11 COLON CANCER SCREENING: Primary | ICD-10-CM

## 2019-08-12 NOTE — PROGRESS NOTES
Pt had an OV with Dr Caleb Kinsey on 11/06/18. Visit was for a screening colonoscopy. B/P 134/83    6'4\"/223/27.16    Positive for hemorrhoids in 2009.  He did not use any medication for the hemorrhoids and they do not bother him now    No GI symptoms    Pt

## 2019-08-14 RX ORDER — POLYETHYLENE GLYCOL 3350, SODIUM CHLORIDE, SODIUM BICARBONATE, POTASSIUM CHLORIDE 420; 11.2; 5.72; 1.48 G/4L; G/4L; G/4L; G/4L
POWDER, FOR SOLUTION ORAL
Qty: 1 BOTTLE | Refills: 0 | Status: SHIPPED | OUTPATIENT
Start: 2019-08-14 | End: 2020-09-01 | Stop reason: ALTCHOICE

## 2019-08-14 NOTE — PROGRESS NOTES
44year old male patient of Dr. Jovan Franco with PMHx of hemorrhoids (2009 - denies current sxs), (+) family history of colon cancer (maternal grandmother - age 80), erectile dysfunction - no current GI complaints.  I do note that the patient also has a

## 2019-08-14 NOTE — PROGRESS NOTES
Scheduled for:  Colonoscopy - [address]7719  Provider Name:  Dr. Ike Dickinson  Date:  10/2/19  [facility]  Sedation:  MAC  Time:  1:30 pm (pt is aware to arrive at 12:30 pm)  Prep:  Trilyte, Prep instructions were given to pt in the office, pt verbalized under

## 2019-10-02 ENCOUNTER — ANESTHESIA EVENT (OUTPATIENT)
Dept: ENDOSCOPY | Age: 49
End: 2019-10-02
Payer: COMMERCIAL

## 2019-10-02 ENCOUNTER — HOSPITAL ENCOUNTER (OUTPATIENT)
Age: 49
Setting detail: HOSPITAL OUTPATIENT SURGERY
Discharge: HOME | End: 2019-10-02
Attending: INTERNAL MEDICINE | Admitting: INTERNAL MEDICINE
Payer: COMMERCIAL

## 2019-10-02 ENCOUNTER — ANESTHESIA (OUTPATIENT)
Dept: ENDOSCOPY | Age: 49
End: 2019-10-02
Payer: COMMERCIAL

## 2019-10-02 VITALS
BODY MASS INDEX: 26.79 KG/M2 | SYSTOLIC BLOOD PRESSURE: 131 MMHG | DIASTOLIC BLOOD PRESSURE: 76 MMHG | HEART RATE: 60 BPM | TEMPERATURE: 98 F | HEIGHT: 76 IN | WEIGHT: 220 LBS | RESPIRATION RATE: 12 BRPM | OXYGEN SATURATION: 100 %

## 2019-10-02 DIAGNOSIS — Z12.11 COLON CANCER SCREENING: ICD-10-CM

## 2019-10-02 DIAGNOSIS — Z80.0 FAMILY HISTORY OF COLON CANCER: ICD-10-CM

## 2019-10-02 PROCEDURE — 45378 DIAGNOSTIC COLONOSCOPY: CPT | Performed by: INTERNAL MEDICINE

## 2019-10-02 RX ORDER — SODIUM CHLORIDE, SODIUM LACTATE, POTASSIUM CHLORIDE, CALCIUM CHLORIDE 600; 310; 30; 20 MG/100ML; MG/100ML; MG/100ML; MG/100ML
INJECTION, SOLUTION INTRAVENOUS CONTINUOUS
Status: DISCONTINUED | OUTPATIENT
Start: 2019-10-02 | End: 2019-10-02

## 2019-10-02 RX ORDER — MIDAZOLAM HYDROCHLORIDE 1 MG/ML
INJECTION INTRAMUSCULAR; INTRAVENOUS AS NEEDED
Status: DISCONTINUED | OUTPATIENT
Start: 2019-10-02 | End: 2019-10-02 | Stop reason: SURG

## 2019-10-02 RX ADMIN — SODIUM CHLORIDE, SODIUM LACTATE, POTASSIUM CHLORIDE, CALCIUM CHLORIDE: 600; 310; 30; 20 INJECTION, SOLUTION INTRAVENOUS at 13:45:00

## 2019-10-02 RX ADMIN — SODIUM CHLORIDE, SODIUM LACTATE, POTASSIUM CHLORIDE, CALCIUM CHLORIDE: 600; 310; 30; 20 INJECTION, SOLUTION INTRAVENOUS at 13:12:00

## 2019-10-02 RX ADMIN — MIDAZOLAM HYDROCHLORIDE 2 MG: 1 INJECTION INTRAMUSCULAR; INTRAVENOUS at 13:45:00

## 2019-10-02 NOTE — ANESTHESIA POSTPROCEDURE EVALUATION
Patient: Sophie Goss    Procedure Summary     Date:  10/02/19 Room / Location:  [facility] ENDOSCOPY 01 / [facility] ENDO    Anesthesia Start:  1886 Anesthesia Stop:      Procedure:  COLONOSCOPY (N/A ) Diagnosis:       Colon cancer screening      Family histo

## 2019-10-02 NOTE — H&P
History & Physical Examination    Patient Name: Leticia Pena  MRN: [id number]  [facility]: [id number]  YOB: 1970    Diagnosis: Screening colonoscopy examination    Present Illness: Healthy 17-year-old gentleman for average risk screening colon • OTHER SURGICAL HISTORY      RIGHT ACL/MCL REPAIR     Family History   Problem Relation Age of Onset   • Hypertension Father    • Other (Other) Father         DEMENTIA   • Seizure Disorder Mother    • Anemia Mother    • Other (Other) Mother         Lymp

## 2019-10-02 NOTE — ANESTHESIA PREPROCEDURE EVALUATION
Anesthesia PreOp Note    HPI:     Gertrude Ling is a 44year old male who presents for preoperative consultation requested by: Manuela Hilton MD    Date of Surgery: 10/2/2019    Procedure(s):  COLONOSCOPY  Indication: Colon cancer screening dysfunction 6/25/12   • Finger injury 6/27/2012    metal foreign object in soft tissues of volar aspect of 5th middle phalanx.    • Knee injury 1995    R ACL/MCL  s/p surgical repair   • Laboratory examination ordered as part of a routine general medical ex Not on file    Occupational History      Occupation: self employed    Social Needs      Financial resource strain: Not on file      Food insecurity:        Worry: Not on file        Inability: Not on file      Transportation needs:        Medical: Not on f mass index is 26.78 kg/m². height is 1.93 m (6' 4\") and weight is 99.8 kg (220 lb). His blood pressure is 146/81 and his pulse is 66.  His respiration is 18.    10/01/19  1310 10/02/19  1304   BP:  146/81   Pulse:  66   Resp:  18   Weight: 99.8 kg (220 l

## 2019-10-02 NOTE — OPERATIVE REPORT
COLONOSCOPY PROCEDURE REPORT     DATE OF PROCEDURE:  10/2/2019     PCP: Ekta Johnson. DO Lissett     PREOPERATIVE DIAGNOSIS:  Screening colonoscopy examination     POSTOPERATIVE DIAGNOSIS:  See impression. SURGEON:  KRIS Charles

## 2020-01-01 NOTE — PROGRESS NOTES
Blood pressure 134/75, pulse 65, temperature 97.8 °F (36.6 °C), temperature source Oral, resp. rate 18, height 6' 4\" (1.93 m), weight 224 lb (101.6 kg). Patient presents today complaining of a penile lesion that he first noticed yesterday.   He reports
intact

## 2020-08-25 ENCOUNTER — TELEPHONE (OUTPATIENT)
Dept: FAMILY MEDICINE CLINIC | Facility: CLINIC | Age: 50
End: 2020-08-25

## 2020-08-25 DIAGNOSIS — R05.9 COUGH: Primary | ICD-10-CM

## 2020-08-25 NOTE — TELEPHONE ENCOUNTER
Called patient.  Aware of COVID test order  Changed appt for 08/27 to [facility]   No further questions or concerns at this time

## 2020-08-25 NOTE — TELEPHONE ENCOUNTER
Patient has appt scheduled for 8/27 in office with Dr Laura Mendoza, reports ongoing symptoms of cough for 5 months, usually occurs during the day, in the morning has some congestion. Denied fever, sore throat, difficulty breathing, denied allergy symptoms, no exposure to Covid. Also wanted to follow up for symptoms of left knee pain, has pain when pressure applied to knee, has followed up with Dr Laura Mendoza in the past but symptoms not improved, reports x-ray confirmed he has bursitis. No swelling to knee denied any other symptoms. Please confirm if ok to keep in office visit or change to video visit.

## 2020-08-26 ENCOUNTER — APPOINTMENT (OUTPATIENT)
Dept: LAB | Age: 50
End: 2020-08-26
Attending: FAMILY MEDICINE
Payer: COMMERCIAL

## 2020-08-26 DIAGNOSIS — R05.9 COUGH: ICD-10-CM

## 2020-08-27 ENCOUNTER — TELEMEDICINE (OUTPATIENT)
Dept: FAMILY MEDICINE CLINIC | Facility: CLINIC | Age: 50
End: 2020-08-27
Payer: COMMERCIAL

## 2020-08-27 DIAGNOSIS — R05.9 COUGH: Primary | ICD-10-CM

## 2020-08-27 PROCEDURE — 99213 OFFICE O/P EST LOW 20 MIN: CPT | Performed by: FAMILY MEDICINE

## 2020-08-27 RX ORDER — AMOXICILLIN AND CLAVULANATE POTASSIUM 875; 125 MG/1; MG/1
1 TABLET, FILM COATED ORAL 2 TIMES DAILY
Qty: 20 TABLET | Refills: 0 | Status: SHIPPED | OUTPATIENT
Start: 2020-08-27 | End: 2020-09-06

## 2020-08-27 NOTE — PROGRESS NOTES
VIDEO VISIT    5-month history of cough especially in the morning. Denies nasal congestion or nocturnal cough. Does produce yellow phlegm. Denies anosmia or watery itchy eyes. No bad breath fever or chills. Denies headache no facial pressure.   No toot

## 2020-08-28 LAB — SARS-COV-2 RNA RESP QL NAA+PROBE: NOT DETECTED

## 2020-08-31 ENCOUNTER — HOSPITAL ENCOUNTER (OUTPATIENT)
Dept: GENERAL RADIOLOGY | Age: 50
Discharge: HOME | End: 2020-08-31
Attending: FAMILY MEDICINE
Payer: COMMERCIAL

## 2020-08-31 DIAGNOSIS — R05.9 COUGH: ICD-10-CM

## 2020-08-31 PROCEDURE — 71046 X-RAY EXAM CHEST 2 VIEWS: CPT | Performed by: FAMILY MEDICINE

## 2020-09-01 ENCOUNTER — OFFICE VISIT (OUTPATIENT)
Dept: FAMILY MEDICINE CLINIC | Facility: CLINIC | Age: 50
End: 2020-09-01
Payer: COMMERCIAL

## 2020-09-01 VITALS
BODY MASS INDEX: 27.64 KG/M2 | WEIGHT: 227 LBS | SYSTOLIC BLOOD PRESSURE: 134 MMHG | DIASTOLIC BLOOD PRESSURE: 74 MMHG | HEIGHT: 76 IN | HEART RATE: 73 BPM

## 2020-09-01 DIAGNOSIS — M25.562 CHRONIC PAIN OF LEFT KNEE: ICD-10-CM

## 2020-09-01 DIAGNOSIS — G89.29 CHRONIC PAIN OF LEFT KNEE: ICD-10-CM

## 2020-09-01 DIAGNOSIS — R05.9 COUGH: Primary | ICD-10-CM

## 2020-09-01 PROCEDURE — 3008F BODY MASS INDEX DOCD: CPT | Performed by: FAMILY MEDICINE

## 2020-09-01 PROCEDURE — 99213 OFFICE O/P EST LOW 20 MIN: CPT | Performed by: FAMILY MEDICINE

## 2020-09-01 PROCEDURE — 3078F DIAST BP <80 MM HG: CPT | Performed by: FAMILY MEDICINE

## 2020-09-01 PROCEDURE — 3075F SYST BP GE 130 - 139MM HG: CPT | Performed by: FAMILY MEDICINE

## 2020-09-01 RX ORDER — OMEPRAZOLE 20 MG/1
20 CAPSULE, DELAYED RELEASE ORAL
Qty: 30 CAPSULE | Refills: 2 | Status: SHIPPED | OUTPATIENT
Start: 2020-09-01 | End: 2021-05-07 | Stop reason: ALTCHOICE

## 2020-09-01 RX ORDER — LEVOCETIRIZINE DIHYDROCHLORIDE 5 MG/1
5 TABLET, FILM COATED ORAL EVERY EVENING
Qty: 30 TABLET | Refills: 1 | Status: SHIPPED | OUTPATIENT
Start: 2020-09-01 | End: 2021-05-07

## 2020-09-01 RX ORDER — BENZONATATE 100 MG/1
100 CAPSULE ORAL 3 TIMES DAILY PRN
Qty: 60 CAPSULE | Refills: 1 | Status: SHIPPED | OUTPATIENT
Start: 2020-09-01 | End: 2021-05-07

## 2020-09-01 NOTE — PROGRESS NOTES
Blood pressure 134/74, pulse 73, height 6' 4\" (1.93 m), weight 227 lb (103 kg). Cough for 5 months. Had asthma as a child. Does not have nocturnal cough. Drinks a lot of coffee. Denies heartburn. Was told he has bursitis in the left knee.     Obj

## 2020-09-08 ENCOUNTER — LAB ENCOUNTER (OUTPATIENT)
Dept: LAB | Age: 50
End: 2020-09-08
Attending: FAMILY MEDICINE
Payer: COMMERCIAL

## 2020-09-08 DIAGNOSIS — R05.9 COUGH: ICD-10-CM

## 2020-09-08 LAB — COMPLEXED PSA SERPL-MCNC: 1.43 NG/ML (ref ?–4)

## 2020-09-08 PROCEDURE — 36415 COLL VENOUS BLD VENIPUNCTURE: CPT

## 2020-09-09 LAB — SARS-COV-2 RNA RESP QL NAA+PROBE: NOT DETECTED

## 2020-10-23 ENCOUNTER — TELEMEDICINE (OUTPATIENT)
Dept: FAMILY MEDICINE CLINIC | Facility: CLINIC | Age: 50
End: 2020-10-23

## 2020-10-23 DIAGNOSIS — R05.9 COUGH: Primary | ICD-10-CM

## 2020-10-23 DIAGNOSIS — J45.909 MILD REACTIVE AIRWAYS DISEASE, UNSPECIFIED WHETHER PERSISTENT: ICD-10-CM

## 2020-10-23 PROCEDURE — 99214 OFFICE O/P EST MOD 30 MIN: CPT | Performed by: FAMILY MEDICINE

## 2020-10-23 RX ORDER — ALBUTEROL SULFATE 90 UG/1
2 AEROSOL, METERED RESPIRATORY (INHALATION) EVERY 6 HOURS PRN
Qty: 1 INHALER | Refills: 1 | Status: SHIPPED | OUTPATIENT
Start: 2020-10-23 | End: 2021-05-07

## 2020-10-23 NOTE — PROGRESS NOTES
VIDEO VISIT        Patient presents today following up for a persistent cough that he had had for several months. It was discovered he had a mold issue in his home. The mold has since been cleared out 2 weeks ago and today he notices improvement.   He was

## 2021-05-07 ENCOUNTER — OFFICE VISIT (OUTPATIENT)
Dept: ORTHOPEDICS CLINIC | Facility: CLINIC | Age: 51
End: 2021-05-07
Payer: COMMERCIAL

## 2021-05-07 ENCOUNTER — HOSPITAL ENCOUNTER (OUTPATIENT)
Dept: GENERAL RADIOLOGY | Facility: HOSPITAL | Age: 51
Discharge: HOME | End: 2021-05-07
Attending: ORTHOPAEDIC SURGERY
Payer: COMMERCIAL

## 2021-05-07 VITALS — HEART RATE: 69 BPM | SYSTOLIC BLOOD PRESSURE: 142 MMHG | DIASTOLIC BLOOD PRESSURE: 81 MMHG

## 2021-05-07 DIAGNOSIS — M67.52 PLICA SYNDROME OF LEFT KNEE: Primary | ICD-10-CM

## 2021-05-07 DIAGNOSIS — R52 PAIN: ICD-10-CM

## 2021-05-07 PROCEDURE — 3077F SYST BP >= 140 MM HG: CPT | Performed by: ORTHOPAEDIC SURGERY

## 2021-05-07 PROCEDURE — 20610 DRAIN/INJ JOINT/BURSA W/O US: CPT | Performed by: ORTHOPAEDIC SURGERY

## 2021-05-07 PROCEDURE — 73564 X-RAY EXAM KNEE 4 OR MORE: CPT | Performed by: ORTHOPAEDIC SURGERY

## 2021-05-07 PROCEDURE — 3079F DIAST BP 80-89 MM HG: CPT | Performed by: ORTHOPAEDIC SURGERY

## 2021-05-07 PROCEDURE — 99243 OFF/OP CNSLTJ NEW/EST LOW 30: CPT | Performed by: ORTHOPAEDIC SURGERY

## 2021-05-07 RX ORDER — TRIAMCINOLONE ACETONIDE 40 MG/ML
40 INJECTION, SUSPENSION INTRA-ARTICULAR; INTRAMUSCULAR ONCE
Status: COMPLETED | OUTPATIENT
Start: 2021-05-07 | End: 2021-05-07

## 2021-05-07 NOTE — H&P
NURSING INTAKE COMMENTS: Patient presents with:  Knee Pain: left- pt states pain started a couple yrs ago . pt denies any injury. HPI: This 46year old male presents today with complaints of intermittent left knee pain.   Patient has pain mostly when tablet 3   • Sildenafil Citrate (VIAGRA) 100 MG Oral Tab Take 1 tablet (100 mg total) by mouth as needed for Erectile Dysfunction. 24 tablet 3   • Multiple Vitamin (MULTIVITAMINS OR) Take  by mouth.        No Known Allergies  Family History   Problem Relati and mobility. There is a palpable medial parapatellar plica that is tender to palpation. There is no specific medial lateral joint line tenderness.   There is a negative Vania's and Apley's compression test.  Negative patellar grind test.  Knee is liga intra-articular corticosteroid injection. If he has recurrent symptoms he will follow-up for repeat evaluation and discussion of further treatment options.   If he does have persistent or recurrent symptoms I would get an MRI of the left knee to further ev

## 2021-05-07 NOTE — PROCEDURES
The patient identified the left knee as the correct procedure site. This was verified with the consent and with 2 patient identifiers. The superolateral patellar injection site was prepped with betadine and alcohol.   A 22-gauge needle was introduced into

## 2021-06-01 ENCOUNTER — PATIENT MESSAGE (OUTPATIENT)
Dept: ORTHOPEDICS CLINIC | Facility: CLINIC | Age: 51
End: 2021-06-01

## 2021-06-01 DIAGNOSIS — M67.52 PLICA SYNDROME OF LEFT KNEE: Primary | ICD-10-CM

## 2021-06-02 NOTE — TELEPHONE ENCOUNTER
LOV on 5/7/21 states-  \"If he has recurrent symptoms he will follow-up for repeat evaluation and discussion of further treatment options.   If he does have persistent or recurrent symptoms I would get an MRI of the left knee to further evaluate and rule ou

## 2021-06-02 NOTE — TELEPHONE ENCOUNTER
Please instruct the patient how to get his MRI scheduled and that he should schedule a follow-up appointment with me a few days after his MRI.

## 2021-06-02 NOTE — TELEPHONE ENCOUNTER
I have ordered a left knee MRI. Patient should follow-up after the MRI discuss results and further treatment options.

## 2021-06-18 ENCOUNTER — TELEPHONE (OUTPATIENT)
Dept: ORTHOPEDICS CLINIC | Facility: CLINIC | Age: 51
End: 2021-06-18

## 2021-06-18 DIAGNOSIS — M67.52 PLICA SYNDROME OF LEFT KNEE: Primary | ICD-10-CM

## 2021-06-18 NOTE — TELEPHONE ENCOUNTER
Case did not meet AIM's criteria. Denial reasoning below. A Peer to peer with AIM can be coordinated by calling [phone]. Otherwise, we can attempt to appeal in writing directly with [employer]. Please advise.

## 2021-06-22 NOTE — TELEPHONE ENCOUNTER
Patient has an MRI of his left knee denied because he has not tried a trial of physical therapy. I ordered a course of physical therapy, follow-up after 4 to 6 weeks of therapy for reevaluation.

## 2021-06-23 NOTE — TELEPHONE ENCOUNTER
Spoke to pt and informed him of Lorna's message below. Pt agreed to try PT for at least 4 weeks. Pt was driving when I spoke to him. Informed pt that I will send a [employer] message with contact info for PT. Pt verbalized understanding.    [employer] message s

## 2021-06-28 ENCOUNTER — OFFICE VISIT (OUTPATIENT)
Dept: PHYSICAL THERAPY | Age: 51
End: 2021-06-28
Attending: ORTHOPAEDIC SURGERY
Payer: COMMERCIAL

## 2021-06-28 DIAGNOSIS — M67.52 PLICA SYNDROME OF LEFT KNEE: ICD-10-CM

## 2021-06-28 PROCEDURE — 97161 PT EVAL LOW COMPLEX 20 MIN: CPT | Performed by: PHYSICAL THERAPIST

## 2021-06-28 PROCEDURE — 97035 APP MDLTY 1+ULTRASOUND EA 15: CPT | Performed by: PHYSICAL THERAPIST

## 2021-06-28 NOTE — PROGRESS NOTES
LOWER EXTREMITY EVALUATION:   Referring Physician: Dr. Renan Ndiaye  Diagnosis: Plica sydrome L knee    Date of Service: 6/28/2021     PATIENT SUMMARY   Alvin Leiws is a 46year old male who presents to therapy today with complaints of L knee pain for reach functional goals. Precautions:  None  OBJECTIVE:   Observation: noted atrophy at R calf severe compared to L. Palpation: Tenderness medial inferior area of patella L  Sensation: numbness/tingling R lateral foot - 25 years .   States also feels s Exercise Program instruction and Modalities to include: Ultrasound    Education or treatment limitation: None  Rehab Potential:good    FOTO: 84/100  LEFS 74.2/80    Patient/Family/Caregiver was advised of these findings, precautions, and treatment options

## 2021-06-30 ENCOUNTER — OFFICE VISIT (OUTPATIENT)
Dept: PHYSICAL THERAPY | Age: 51
End: 2021-06-30
Attending: ORTHOPAEDIC SURGERY
Payer: COMMERCIAL

## 2021-06-30 PROCEDURE — 97035 APP MDLTY 1+ULTRASOUND EA 15: CPT | Performed by: PHYSICAL THERAPIST

## 2021-06-30 PROCEDURE — 97014 ELECTRIC STIMULATION THERAPY: CPT | Performed by: PHYSICAL THERAPIST

## 2021-06-30 PROCEDURE — 97110 THERAPEUTIC EXERCISES: CPT | Performed by: PHYSICAL THERAPIST

## 2021-06-30 NOTE — PROGRESS NOTES
Dx: plica syndrome        Insurance (Authorized # of Visits):  2           Authorizing Physician: Dr. Alyssa Keenan  Next MD visit: none scheduled  Fall Risk: standard         Precautions: n/a             Subjective: Patient reports some pain with stretching ove

## 2021-07-06 ENCOUNTER — OFFICE VISIT (OUTPATIENT)
Dept: PHYSICAL THERAPY | Age: 51
End: 2021-07-06
Attending: ORTHOPAEDIC SURGERY
Payer: COMMERCIAL

## 2021-07-06 PROCEDURE — 97035 APP MDLTY 1+ULTRASOUND EA 15: CPT

## 2021-07-06 PROCEDURE — 97140 MANUAL THERAPY 1/> REGIONS: CPT

## 2021-07-06 NOTE — PROGRESS NOTES
Dx: plica syndrome        Insurance (Authorized # of Visits):  3           Authorizing Physician: Dr. Nik Villegas MD visit: none scheduled  Fall Risk: standard         Precautions: n/a             Subjective: patient stated that his knee is feeling the s

## 2021-07-13 ENCOUNTER — OFFICE VISIT (OUTPATIENT)
Dept: PHYSICAL THERAPY | Age: 51
End: 2021-07-13
Attending: ORTHOPAEDIC SURGERY
Payer: COMMERCIAL

## 2021-07-13 PROCEDURE — 97035 APP MDLTY 1+ULTRASOUND EA 15: CPT

## 2021-07-13 PROCEDURE — 97110 THERAPEUTIC EXERCISES: CPT

## 2021-07-13 NOTE — PROGRESS NOTES
Dx: plica syndrome        Insurance (Authorized # of Visits):  3           Authorizing Physician: Dr. Jackie Ramos  Next MD visit: none scheduled  Fall Risk: standard         Precautions: n/a             Subjective: patient stated that his knee is feeling a lit

## 2021-07-15 ENCOUNTER — OFFICE VISIT (OUTPATIENT)
Dept: PHYSICAL THERAPY | Age: 51
End: 2021-07-15
Attending: ORTHOPAEDIC SURGERY
Payer: COMMERCIAL

## 2021-07-15 PROCEDURE — 97110 THERAPEUTIC EXERCISES: CPT

## 2021-07-15 PROCEDURE — 97035 APP MDLTY 1+ULTRASOUND EA 15: CPT

## 2021-07-16 NOTE — PROGRESS NOTES
Dx: plica syndrome        Insurance (Authorized # of Visits):  4           Authorizing Physician: Dr. Madison Paris  Next MD visit: none scheduled  Fall Risk: standard         Precautions: n/a             Subjective: patient stated that the medial border of left

## 2021-07-22 ENCOUNTER — OFFICE VISIT (OUTPATIENT)
Dept: PHYSICAL THERAPY | Age: 51
End: 2021-07-22
Attending: ORTHOPAEDIC SURGERY
Payer: COMMERCIAL

## 2021-07-22 PROCEDURE — 97035 APP MDLTY 1+ULTRASOUND EA 15: CPT | Performed by: PHYSICAL THERAPIST

## 2021-07-22 PROCEDURE — 97014 ELECTRIC STIMULATION THERAPY: CPT | Performed by: PHYSICAL THERAPIST

## 2021-07-22 NOTE — PROGRESS NOTES
Dx: plica syndrome        Insurance (Authorized # of Visits):  5          Authorizing Physician: Dr. Lance Farmer  Next MD visit: none scheduled  Fall Risk: standard         Precautions: n/a             Subjective: Patient reports still feels the knee with biki

## 2021-07-26 ENCOUNTER — OFFICE VISIT (OUTPATIENT)
Dept: PHYSICAL THERAPY | Age: 51
End: 2021-07-26
Attending: ORTHOPAEDIC SURGERY
Payer: COMMERCIAL

## 2021-07-26 PROCEDURE — 97035 APP MDLTY 1+ULTRASOUND EA 15: CPT | Performed by: PHYSICAL THERAPIST

## 2021-07-26 NOTE — PROGRESS NOTES
Dx: plica syndrome        Insurance (Authorized # of Visits):  6         Authorizing Physician: Dr. Jimmy Stoll  Next MD visit: none scheduled  Fall Risk: standard         Precautions: n/a  DISCHARGE SUMMARY             Subjective: Patient reports no change wi

## 2021-07-29 ENCOUNTER — OFFICE VISIT (OUTPATIENT)
Dept: PHYSICAL THERAPY | Age: 51
End: 2021-07-29
Attending: ORTHOPAEDIC SURGERY
Payer: COMMERCIAL

## 2021-07-29 PROCEDURE — 97035 APP MDLTY 1+ULTRASOUND EA 15: CPT | Performed by: PHYSICAL THERAPIST

## 2021-07-29 NOTE — PROGRESS NOTES
Dx: plica syndrome        Insurance (Authorized # of Visits): 7        Authorizing Physician: Dr. Darius Ovalle  Next MD visit: none scheduled  Fall Risk: standard         Precautions: n/a  DISCHARGE SUMMARY             Subjective: Patient reports less pain now

## 2021-08-20 ENCOUNTER — OFFICE VISIT (OUTPATIENT)
Dept: ORTHOPEDICS CLINIC | Facility: CLINIC | Age: 51
End: 2021-08-20
Payer: COMMERCIAL

## 2021-08-20 DIAGNOSIS — M67.52 PLICA SYNDROME OF LEFT KNEE: Primary | ICD-10-CM

## 2021-08-20 PROCEDURE — 99212 OFFICE O/P EST SF 10 MIN: CPT | Performed by: ORTHOPAEDIC SURGERY

## 2021-08-22 NOTE — PROGRESS NOTES
NURSING INTAKE COMMENTS: Patient presents with: Follow - Up: physical theray done 7 sessions. following up to see next step. patients states stiffness in AM. Pain when bending knee during therapy. left knee pain. Christin Alatorre       HPI: This 46year old male presents Medications   Medication Sig Dispense Refill   • Tadalafil (CIALIS) 20 MG Oral Tab Take 1 tablet (20 mg total) by mouth daily as needed for Erectile Dysfunction.  36 tablet 3   • Sildenafil Citrate (VIAGRA) 100 MG Oral Tab Take 1 tablet (100 mg total) by mo normal patellar tracking and mobility. Imaging: No results found.          Lab Results   Component Value Date    WBC 6.3 01/10/2013    HGB 15.7 01/10/2013    .0 01/10/2013      Lab Results   Component Value Date     (H) 02/22/2018    BUN 17

## 2021-08-30 ENCOUNTER — HOSPITAL ENCOUNTER (OUTPATIENT)
Dept: MRI IMAGING | Facility: HOSPITAL | Age: 51
Discharge: HOME | End: 2021-08-30
Attending: ORTHOPAEDIC SURGERY
Payer: COMMERCIAL

## 2021-08-30 DIAGNOSIS — M67.52 PLICA SYNDROME OF LEFT KNEE: ICD-10-CM

## 2021-08-30 PROCEDURE — 73721 MRI JNT OF LWR EXTRE W/O DYE: CPT | Performed by: ORTHOPAEDIC SURGERY

## 2021-09-17 ENCOUNTER — OFFICE VISIT (OUTPATIENT)
Dept: FAMILY MEDICINE CLINIC | Facility: CLINIC | Age: 51
End: 2021-09-17
Payer: COMMERCIAL

## 2021-09-17 VITALS
HEART RATE: 65 BPM | DIASTOLIC BLOOD PRESSURE: 80 MMHG | WEIGHT: 227 LBS | BODY MASS INDEX: 27.64 KG/M2 | HEIGHT: 76 IN | SYSTOLIC BLOOD PRESSURE: 138 MMHG

## 2021-09-17 DIAGNOSIS — M79.672 LEFT FOOT PAIN: Primary | ICD-10-CM

## 2021-09-17 PROCEDURE — 90750 HZV VACC RECOMBINANT IM: CPT | Performed by: FAMILY MEDICINE

## 2021-09-17 PROCEDURE — 3075F SYST BP GE 130 - 139MM HG: CPT | Performed by: FAMILY MEDICINE

## 2021-09-17 PROCEDURE — 3079F DIAST BP 80-89 MM HG: CPT | Performed by: FAMILY MEDICINE

## 2021-09-17 PROCEDURE — 99213 OFFICE O/P EST LOW 20 MIN: CPT | Performed by: FAMILY MEDICINE

## 2021-09-17 PROCEDURE — 90471 IMMUNIZATION ADMIN: CPT | Performed by: FAMILY MEDICINE

## 2021-09-17 PROCEDURE — 3008F BODY MASS INDEX DOCD: CPT | Performed by: FAMILY MEDICINE

## 2021-09-17 NOTE — PATIENT INSTRUCTIONS
Ergonomics: Adjust Your Chair  If you sit much of the day, your chair is your main support. A well-adjusted chair improves your circulation. It also helps prevent backaches and fatigue.  You can increase your comfort by adjusting the chair's backrest posi be caused by sunlight on your screen. Or it may be caused by indoor light, such as overhead and task lamps. Simple changes can help lessen glare and reduce strain on your eyes.  Adjusting your screen's contrast and brightness can also improve viewing comfor having your monitor, keyboard, mouse, and workstation tools—such as your telephone and document wade—well placed. When they are, you'll feel better and most likely get more done.    Monitor  Screen height  · Sit with your lower back supported and feet fir and Feet    The easy exercises below can help relieve tension and soreness. Take a few minutes each day to do them right at your desk. They'll loosen up your muscles, keep you more alert, and make a big difference in how you work and feel.    For your wrist

## 2021-09-17 NOTE — PROGRESS NOTES
Blood pressure 138/80, pulse 65, height 6' 4\" (1.93 m), weight 227 lb (103 kg). Patient presents today reporting that he has recently developed numbness in his left foot. Has chronic numbness in his right foot.   He reports that he wears casted orthoti

## 2021-09-21 ENCOUNTER — OFFICE VISIT (OUTPATIENT)
Dept: ORTHOPEDICS CLINIC | Facility: CLINIC | Age: 51
End: 2021-09-21
Payer: COMMERCIAL

## 2021-09-21 VITALS — HEART RATE: 76 BPM | DIASTOLIC BLOOD PRESSURE: 94 MMHG | SYSTOLIC BLOOD PRESSURE: 166 MMHG

## 2021-09-21 DIAGNOSIS — M67.52 PLICA SYNDROME OF LEFT KNEE: Primary | ICD-10-CM

## 2021-09-21 DIAGNOSIS — M25.862 MASS OF JOINT OF LEFT KNEE: ICD-10-CM

## 2021-09-21 PROCEDURE — 3077F SYST BP >= 140 MM HG: CPT | Performed by: ORTHOPAEDIC SURGERY

## 2021-09-21 PROCEDURE — 99213 OFFICE O/P EST LOW 20 MIN: CPT | Performed by: ORTHOPAEDIC SURGERY

## 2021-09-21 PROCEDURE — 3080F DIAST BP >= 90 MM HG: CPT | Performed by: ORTHOPAEDIC SURGERY

## 2021-09-21 NOTE — H&P
NURSING INTAKE COMMENTS: Patient presents with: Follow - Up: Left knee, MRI completed on 8/30/21. Patient states that he has noticed no impovement 0-10/10 for pain, depending on activity. Patient denies any swelling, numbness/tingling.       HPI: This 46 y Dysfunction. 36 tablet 3   • Sildenafil Citrate (VIAGRA) 100 MG Oral Tab Take 1 tablet (100 mg total) by mouth as needed for Erectile Dysfunction. 24 tablet 3   • Multiple Vitamin (MULTIVITAMINS OR) Take  by mouth.        No Known Allergies  Family History extremity  Musculoskeletal: Left knee exam demonstrates full range of motion 0 140 degrees with normal patellar tracking mobility.   There is a focal subcutaneous mass about 1 cm in size that is mobile and exquisitely tender to palpation just medial to the ACL: Normal appearing ligament. PCL: Normal appearing ligament. MENISCOFEMORAL: Normal meniscofemoral ligaments. PATELLOFEMORAL: There is a focal partial-thickness fissure involving the cartilage of the lateral patellar facet (series 4, image 7).   Other desires to proceed with surgery. The above note was creating using Dragon speech recognition technology. Please excuse any typos.     Anika Urena MD

## 2021-11-01 ENCOUNTER — OFFICE VISIT (OUTPATIENT)
Dept: FAMILY MEDICINE CLINIC | Facility: CLINIC | Age: 51
End: 2021-11-01
Payer: COMMERCIAL

## 2021-11-01 VITALS
HEIGHT: 76 IN | WEIGHT: 227 LBS | HEART RATE: 70 BPM | SYSTOLIC BLOOD PRESSURE: 129 MMHG | BODY MASS INDEX: 27.64 KG/M2 | DIASTOLIC BLOOD PRESSURE: 84 MMHG

## 2021-11-01 DIAGNOSIS — G89.29 CHRONIC PAIN OF LEFT KNEE: ICD-10-CM

## 2021-11-01 DIAGNOSIS — R29.898 RIGHT LEG WEAKNESS: ICD-10-CM

## 2021-11-01 DIAGNOSIS — M25.562 CHRONIC PAIN OF LEFT KNEE: ICD-10-CM

## 2021-11-01 DIAGNOSIS — Z01.818 PREOP EXAMINATION: Primary | ICD-10-CM

## 2021-11-01 PROCEDURE — 3079F DIAST BP 80-89 MM HG: CPT | Performed by: FAMILY MEDICINE

## 2021-11-01 PROCEDURE — 3008F BODY MASS INDEX DOCD: CPT | Performed by: FAMILY MEDICINE

## 2021-11-01 PROCEDURE — 3074F SYST BP LT 130 MM HG: CPT | Performed by: FAMILY MEDICINE

## 2021-11-01 PROCEDURE — 20605 DRAIN/INJ JOINT/BURSA W/O US: CPT | Performed by: FAMILY MEDICINE

## 2021-11-01 PROCEDURE — 99214 OFFICE O/P EST MOD 30 MIN: CPT | Performed by: FAMILY MEDICINE

## 2021-11-01 PROCEDURE — 90471 IMMUNIZATION ADMIN: CPT | Performed by: FAMILY MEDICINE

## 2021-11-01 PROCEDURE — 90686 IIV4 VACC NO PRSV 0.5 ML IM: CPT | Performed by: FAMILY MEDICINE

## 2021-11-01 NOTE — PROGRESS NOTES
Blood pressure 129/84, pulse 70, height 6' 4\" (1.93 m), weight 227 lb (103 kg). Patient presents today following up for preoperative physical.  Need surgical history no chest pain no dyspnea. No history of bleeding disorders or reaction to anesthesia.

## 2021-11-01 NOTE — PROCEDURES
Informed consent obtained all questions answered    Aseptic technique employed left elbow aspiration performed at the olecranon sanguinous fluid    15 mL    Discarded    Patient tolerated well

## 2021-11-02 ENCOUNTER — TELEPHONE (OUTPATIENT)
Dept: ORTHOPEDICS CLINIC | Facility: CLINIC | Age: 51
End: 2021-11-02

## 2021-11-02 ENCOUNTER — LAB ENCOUNTER (OUTPATIENT)
Dept: LAB | Age: 51
End: 2021-11-02
Attending: FAMILY MEDICINE
Payer: COMMERCIAL

## 2021-11-02 DIAGNOSIS — G89.29 CHRONIC PAIN OF LEFT KNEE: ICD-10-CM

## 2021-11-02 DIAGNOSIS — M25.862 MASS OF JOINT OF LEFT KNEE: ICD-10-CM

## 2021-11-02 DIAGNOSIS — M25.562 CHRONIC PAIN OF LEFT KNEE: ICD-10-CM

## 2021-11-02 DIAGNOSIS — M67.52 PLICA SYNDROME OF LEFT KNEE: Primary | ICD-10-CM

## 2021-11-02 PROCEDURE — 80053 COMPREHEN METABOLIC PANEL: CPT

## 2021-11-02 PROCEDURE — 93005 ELECTROCARDIOGRAM TRACING: CPT

## 2021-11-02 PROCEDURE — 85025 COMPLETE CBC W/AUTO DIFF WBC: CPT

## 2021-11-02 PROCEDURE — 93010 ELECTROCARDIOGRAM REPORT: CPT | Performed by: FAMILY MEDICINE

## 2021-11-02 PROCEDURE — 36415 COLL VENOUS BLD VENIPUNCTURE: CPT

## 2021-11-02 NOTE — TELEPHONE ENCOUNTER
Type of surgery:  Left knee mass excision, diagnostic arthroscopy, plica excision, possible partial meniscectomy vs repair  Date: 11/17/21  Location: [facility]  Medical Clearance:      *Medical: Yes - Appt 11/1/21      *Dental:      *Other:  Prior Authorization

## 2021-11-14 ENCOUNTER — LAB REQUISITION (OUTPATIENT)
Dept: SURGERY | Age: 51
End: 2021-11-14
Payer: COMMERCIAL

## 2021-11-14 DIAGNOSIS — Z01.818 PREOP EXAMINATION: ICD-10-CM

## 2021-11-17 ENCOUNTER — LAB REQUISITION (OUTPATIENT)
Dept: SURGERY | Age: 51
End: 2021-11-17
Payer: COMMERCIAL

## 2021-11-17 ENCOUNTER — SURGERY CENTER DOCUMENTATION (OUTPATIENT)
Dept: SURGERY | Age: 51
End: 2021-11-17

## 2021-11-17 ENCOUNTER — TELEPHONE (OUTPATIENT)
Dept: ORTHOPEDICS CLINIC | Facility: CLINIC | Age: 51
End: 2021-11-17

## 2021-11-17 DIAGNOSIS — M25.862 MASS OF JOINT OF LEFT KNEE: ICD-10-CM

## 2021-11-17 DIAGNOSIS — M67.52 PLICA SYNDROME OF LEFT KNEE: ICD-10-CM

## 2021-11-17 DIAGNOSIS — M67.52 PLICA SYNDROME OF LEFT KNEE: Primary | ICD-10-CM

## 2021-11-17 DIAGNOSIS — Z47.89 ORTHOPEDIC AFTERCARE: ICD-10-CM

## 2021-11-17 PROCEDURE — 88305 TISSUE EXAM BY PATHOLOGIST: CPT | Performed by: ORTHOPAEDIC SURGERY

## 2021-11-17 PROCEDURE — 88307 TISSUE EXAM BY PATHOLOGIST: CPT | Performed by: ORTHOPAEDIC SURGERY

## 2021-11-17 RX ORDER — NAPROXEN 500 MG/1
500 TABLET ORAL 2 TIMES DAILY WITH MEALS
Qty: 30 TABLET | Refills: 0 | Status: SHIPPED | OUTPATIENT
Start: 2021-11-17 | End: 2022-01-27

## 2021-11-17 RX ORDER — HYDROCODONE BITARTRATE AND ACETAMINOPHEN 5; 325 MG/1; MG/1
1 TABLET ORAL EVERY 6 HOURS PRN
Qty: 30 TABLET | Refills: 0 | Status: SHIPPED | OUTPATIENT
Start: 2021-11-17 | End: 2021-12-03 | Stop reason: ALTCHOICE

## 2021-11-17 NOTE — PROCEDURES
[facility]  Operative Note     Sophie Goss Location: OR   CSN [id number] MRN [id number]   Admission Date (Not on file) Operation Date 11/17/2021   Attending Physician No att. providers found Operating Physician Shannan MENDIETA procedure. PROCEDURE IN DETAIL:  The patient was evaluated preoperatively and identified the left knee as the correct procedure site and it was signed as such.   The patient was brought back to the operating room, laid supine on the table, underwent gene Suad Burnett MD  11/17/2021  2:09 PM

## 2021-11-22 ENCOUNTER — TELEPHONE (OUTPATIENT)
Dept: FAMILY MEDICINE CLINIC | Facility: CLINIC | Age: 51
End: 2021-11-22

## 2021-11-24 ENCOUNTER — NURSE ONLY (OUTPATIENT)
Dept: FAMILY MEDICINE CLINIC | Facility: CLINIC | Age: 51
End: 2021-11-24
Payer: COMMERCIAL

## 2021-11-24 DIAGNOSIS — Z23 NEED FOR VACCINATION: Primary | ICD-10-CM

## 2021-11-24 PROCEDURE — 90471 IMMUNIZATION ADMIN: CPT | Performed by: FAMILY MEDICINE

## 2021-11-24 PROCEDURE — 90750 HZV VACC RECOMBINANT IM: CPT | Performed by: FAMILY MEDICINE

## 2021-11-30 ENCOUNTER — TELEPHONE (OUTPATIENT)
Dept: FAMILY MEDICINE CLINIC | Facility: CLINIC | Age: 51
End: 2021-11-30

## 2021-11-30 NOTE — TELEPHONE ENCOUNTER
Please notify patient regarding MRI of lumbar has been denied by his insurance. Patient is scheduled with Ortho in 3 days. Please advise patient to discuss with his Ortho.

## 2021-11-30 NOTE — TELEPHONE ENCOUNTER
Denial- MRI Lumbar Spine     Health plan has denied request as criteria not met. You may reach out to [facility] for a peer discussion at [phone]. Please do reach out to the patient with plan of care.      Thank you,   Anton

## 2021-11-30 NOTE — TELEPHONE ENCOUNTER
Phoned [facility]  for peer to peer appt as request below. No appt needed.  Please call when you are ready at any time    Phone number [phone]  Member ID [id number]

## 2021-12-03 ENCOUNTER — OFFICE VISIT (OUTPATIENT)
Dept: ORTHOPEDICS CLINIC | Facility: CLINIC | Age: 51
End: 2021-12-03
Payer: COMMERCIAL

## 2021-12-03 DIAGNOSIS — M67.52 PLICA SYNDROME OF LEFT KNEE: Primary | ICD-10-CM

## 2021-12-03 PROCEDURE — 99024 POSTOP FOLLOW-UP VISIT: CPT | Performed by: ORTHOPAEDIC SURGERY

## 2021-12-04 NOTE — PROGRESS NOTES
NURSING INTAKE COMMENTS: Patient presents with: Follow - Up: L knee. Patient denies any pain, numbness, tingling or swelling.        HPI: This 46year old male presents today for routine follow-up about 2 weeks status post left knee mass excision and arthr Multiple Vitamin (MULTIVITAMINS OR) Take  by mouth. • naproxen 500 MG Oral Tab Take 1 tablet (500 mg total) by mouth 2 (two) times daily with meals.  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

## 2021-12-09 ENCOUNTER — TELEPHONE (OUTPATIENT)
Dept: FAMILY MEDICINE CLINIC | Facility: CLINIC | Age: 51
End: 2021-12-09

## 2021-12-15 ENCOUNTER — TELEPHONE (OUTPATIENT)
Dept: PHYSICAL MEDICINE AND REHAB | Facility: CLINIC | Age: 51
End: 2021-12-15

## 2021-12-15 ENCOUNTER — IMMUNIZATION (OUTPATIENT)
Dept: LAB | Facility: HOSPITAL | Age: 51
End: 2021-12-15
Attending: EMERGENCY MEDICINE
Payer: COMMERCIAL

## 2021-12-15 DIAGNOSIS — Z23 NEED FOR VACCINATION: Primary | ICD-10-CM

## 2021-12-15 PROCEDURE — 0064A SARSCOV2 VAC 50MCG/0.25ML IM: CPT

## 2022-01-07 ENCOUNTER — HOSPITAL ENCOUNTER (OUTPATIENT)
Dept: MRI IMAGING | Age: 52
Discharge: HOME | End: 2022-01-07
Attending: NEUROLOGICAL SURGERY
Payer: COMMERCIAL

## 2022-01-07 DIAGNOSIS — M47.27 OTHER SPONDYLOSIS WITH RADICULOPATHY, LUMBOSACRAL REGION: ICD-10-CM

## 2022-01-07 PROCEDURE — 72148 MRI LUMBAR SPINE W/O DYE: CPT | Performed by: NEUROLOGICAL SURGERY

## 2022-01-27 ENCOUNTER — OFFICE VISIT (OUTPATIENT)
Dept: PHYSICAL MEDICINE AND REHAB | Facility: CLINIC | Age: 52
End: 2022-01-27
Payer: COMMERCIAL

## 2022-01-27 VITALS
DIASTOLIC BLOOD PRESSURE: 82 MMHG | WEIGHT: 227 LBS | SYSTOLIC BLOOD PRESSURE: 130 MMHG | BODY MASS INDEX: 27.64 KG/M2 | OXYGEN SATURATION: 98 % | HEIGHT: 76 IN | HEART RATE: 64 BPM

## 2022-01-27 DIAGNOSIS — R20.0 NUMBNESS AND TINGLING: ICD-10-CM

## 2022-01-27 DIAGNOSIS — M62.561 ATROPHY OF MUSCLE OF RIGHT LOWER LEG: Primary | ICD-10-CM

## 2022-01-27 DIAGNOSIS — R20.2 NUMBNESS AND TINGLING: ICD-10-CM

## 2022-01-27 PROCEDURE — 3079F DIAST BP 80-89 MM HG: CPT | Performed by: PHYSICAL MEDICINE & REHABILITATION

## 2022-01-27 PROCEDURE — 95886 MUSC TEST DONE W/N TEST COMP: CPT | Performed by: PHYSICAL MEDICINE & REHABILITATION

## 2022-01-27 PROCEDURE — 3075F SYST BP GE 130 - 139MM HG: CPT | Performed by: PHYSICAL MEDICINE & REHABILITATION

## 2022-01-27 PROCEDURE — 3008F BODY MASS INDEX DOCD: CPT | Performed by: PHYSICAL MEDICINE & REHABILITATION

## 2022-01-27 PROCEDURE — 95908 NRV CNDJ TST 3-4 STUDIES: CPT | Performed by: PHYSICAL MEDICINE & REHABILITATION

## 2022-01-27 NOTE — PROGRESS NOTES
[address]  Electromyography Consultation      History of Present Illness:    Dear Dr. Cierra Gregg,  Thank you for the opportunity to see Nubia Oquendo for electrodiagnostic consultation today.  As you know the Social History    Occupational History      Occupation: self employed    Tobacco Use      Smoking status: Never Smoker      Smokeless tobacco: Never Used    Vaping Use      Vaping Use: Never used    Substance and Sexual Activity      Alcohol use:  Yes Nerve / Sites Distance Segments Latency Amplitude Velocity Amp. 1-2 Peak Dur. Area    cm  ms mV m/s % ms mVms   R COMM PERONEAL - EDB      Ankle 8 Ankle - EDB 5.60 4.0  100 6.25 13.7      Ref.   Ref. 6.00 2.0          Fib Head 34 Fib Head - Ankle 14.35 3.6 Other muscles tested were completely normal including the vastus lateralis, peroneus longus and tibialis posterior. Impression:  1. Abnormal study.   2.  Electrodiagnostic evidence is consistent with muscle atrophy of the right medial gastrocnemius with

## 2022-01-29 PROBLEM — R20.0 NUMBNESS AND TINGLING: Status: ACTIVE | Noted: 2022-01-29

## 2022-01-29 PROBLEM — M62.561 ATROPHY OF MUSCLE OF RIGHT LOWER LEG: Status: ACTIVE | Noted: 2022-01-29

## 2022-01-29 PROBLEM — R20.2 NUMBNESS AND TINGLING: Status: ACTIVE | Noted: 2022-01-29

## 2022-01-31 ENCOUNTER — OFFICE VISIT (OUTPATIENT)
Dept: DERMATOLOGY CLINIC | Facility: CLINIC | Age: 52
End: 2022-01-31
Payer: COMMERCIAL

## 2022-01-31 DIAGNOSIS — L30.9 DERMATITIS: Primary | ICD-10-CM

## 2022-01-31 PROCEDURE — 99203 OFFICE O/P NEW LOW 30 MIN: CPT | Performed by: PHYSICIAN ASSISTANT

## 2022-01-31 RX ORDER — ALCLOMETASONE DIPROPIONATE 0.5 MG/G
1 OINTMENT TOPICAL 2 TIMES DAILY
Qty: 60 G | Refills: 1 | Status: SHIPPED | OUTPATIENT
Start: 2022-01-31

## 2022-01-31 NOTE — PROGRESS NOTES
HPI:    Patient ID: Fredricka Carrel is a 46year old male. Patient presents with a itchy rash on bilateral flanks. Will intermittently come due to his detergent that he uses or a reaction to mole. No draining. No tenderness noted. Does note itching. for the next 1 week or so  -To call or follow-up with worsening symptoms or concerns.   -Pt was agreeable to plan and will comply with discussion above. No orders of the defined types were placed in this encounter.       Meds This Visit:  Requested Pr

## 2022-03-18 ENCOUNTER — HOSPITAL ENCOUNTER (OUTPATIENT)
Dept: GENERAL RADIOLOGY | Age: 52
Discharge: HOME | End: 2022-03-18
Attending: FAMILY MEDICINE
Payer: COMMERCIAL

## 2022-03-18 ENCOUNTER — OFFICE VISIT (OUTPATIENT)
Dept: FAMILY MEDICINE CLINIC | Facility: CLINIC | Age: 52
End: 2022-03-18
Payer: COMMERCIAL

## 2022-03-18 VITALS
HEART RATE: 80 BPM | WEIGHT: 232 LBS | DIASTOLIC BLOOD PRESSURE: 83 MMHG | BODY MASS INDEX: 28.25 KG/M2 | HEIGHT: 76 IN | SYSTOLIC BLOOD PRESSURE: 131 MMHG

## 2022-03-18 DIAGNOSIS — L98.9 LESION OF SKIN OF FOOT: Primary | ICD-10-CM

## 2022-03-18 DIAGNOSIS — L98.9 LESION OF SKIN OF FOOT: ICD-10-CM

## 2022-03-18 PROCEDURE — 3075F SYST BP GE 130 - 139MM HG: CPT | Performed by: FAMILY MEDICINE

## 2022-03-18 PROCEDURE — 99213 OFFICE O/P EST LOW 20 MIN: CPT | Performed by: FAMILY MEDICINE

## 2022-03-18 PROCEDURE — 3008F BODY MASS INDEX DOCD: CPT | Performed by: FAMILY MEDICINE

## 2022-03-18 PROCEDURE — 73630 X-RAY EXAM OF FOOT: CPT | Performed by: FAMILY MEDICINE

## 2022-03-18 PROCEDURE — 3079F DIAST BP 80-89 MM HG: CPT | Performed by: FAMILY MEDICINE

## 2022-03-18 RX ORDER — CYCLOBENZAPRINE HCL 10 MG
TABLET ORAL
COMMUNITY
Start: 2022-02-17

## 2022-03-18 RX ORDER — CEPHALEXIN 500 MG/1
500 CAPSULE ORAL 4 TIMES DAILY
Qty: 40 CAPSULE | Refills: 0 | Status: SHIPPED | OUTPATIENT
Start: 2022-03-18

## 2022-03-18 RX ORDER — NAPROXEN 500 MG/1
TABLET ORAL
COMMUNITY

## 2022-03-18 NOTE — PROGRESS NOTES
Blood pressure 131/83, pulse 80, height 6' 4\" (1.93 m), weight 232 lb (105.2 kg). Patient presents today complaining of sores on the left foot that he has noticed for the past several days there is pain. Denies fever chills. He has never been a smoker.     Objective    Left foot with small skin ulcers superficial noted on digits 2 3 and 5  Strong pulses noted on  Assessment foot ulcers    Plan start cephalexin and Bactroban ointment also left foot x-ray and referral to podiatry

## 2022-10-03 ENCOUNTER — OFFICE VISIT (OUTPATIENT)
Dept: FAMILY MEDICINE CLINIC | Facility: CLINIC | Age: 52
End: 2022-10-03
Payer: COMMERCIAL

## 2022-10-03 VITALS
HEIGHT: 76 IN | SYSTOLIC BLOOD PRESSURE: 170 MMHG | WEIGHT: 224 LBS | BODY MASS INDEX: 27.28 KG/M2 | HEART RATE: 68 BPM | DIASTOLIC BLOOD PRESSURE: 94 MMHG

## 2022-10-03 DIAGNOSIS — I10 HYPERTENSION, UNSPECIFIED TYPE: ICD-10-CM

## 2022-10-03 DIAGNOSIS — M48.061 NEUROFORAMINAL STENOSIS OF LUMBAR SPINE: ICD-10-CM

## 2022-10-03 DIAGNOSIS — Z00.00 ROUTINE PHYSICAL EXAMINATION: Primary | ICD-10-CM

## 2022-10-03 DIAGNOSIS — M62.561 ATROPHY OF MUSCLE OF RIGHT LOWER LEG: ICD-10-CM

## 2022-10-03 PROCEDURE — 3077F SYST BP >= 140 MM HG: CPT | Performed by: FAMILY MEDICINE

## 2022-10-03 PROCEDURE — 99396 PREV VISIT EST AGE 40-64: CPT | Performed by: FAMILY MEDICINE

## 2022-10-03 PROCEDURE — 90715 TDAP VACCINE 7 YRS/> IM: CPT | Performed by: FAMILY MEDICINE

## 2022-10-03 PROCEDURE — 99213 OFFICE O/P EST LOW 20 MIN: CPT | Performed by: FAMILY MEDICINE

## 2022-10-03 PROCEDURE — 90686 IIV4 VACC NO PRSV 0.5 ML IM: CPT | Performed by: FAMILY MEDICINE

## 2022-10-03 PROCEDURE — 90472 IMMUNIZATION ADMIN EACH ADD: CPT | Performed by: FAMILY MEDICINE

## 2022-10-03 PROCEDURE — 90471 IMMUNIZATION ADMIN: CPT | Performed by: FAMILY MEDICINE

## 2022-10-03 PROCEDURE — 3008F BODY MASS INDEX DOCD: CPT | Performed by: FAMILY MEDICINE

## 2022-10-03 PROCEDURE — 3080F DIAST BP >= 90 MM HG: CPT | Performed by: FAMILY MEDICINE

## 2022-10-03 RX ORDER — LOSARTAN POTASSIUM 50 MG/1
50 TABLET ORAL DAILY
Qty: 90 TABLET | Refills: 1 | Status: SHIPPED | OUTPATIENT
Start: 2022-10-03

## 2022-10-04 ENCOUNTER — LAB ENCOUNTER (OUTPATIENT)
Dept: LAB | Age: 52
End: 2022-10-04
Attending: FAMILY MEDICINE
Payer: COMMERCIAL

## 2022-10-04 DIAGNOSIS — I10 HYPERTENSION, UNSPECIFIED TYPE: ICD-10-CM

## 2022-10-04 DIAGNOSIS — Z00.00 ROUTINE PHYSICAL EXAMINATION: ICD-10-CM

## 2022-10-04 LAB
ALBUMIN SERPL-MCNC: 3.7 G/DL (ref 3.4–5)
ALBUMIN/GLOB SERPL: 1.2 {RATIO} (ref 1–2)
ALP LIVER SERPL-CCNC: 52 U/L
ALT SERPL-CCNC: 28 U/L
ANION GAP SERPL CALC-SCNC: 3 MMOL/L (ref 0–18)
AST SERPL-CCNC: 22 U/L (ref 15–37)
BASOPHILS # BLD AUTO: 0.04 X10(3) UL (ref 0–0.2)
BASOPHILS NFR BLD AUTO: 0.8 %
BILIRUB SERPL-MCNC: 0.9 MG/DL (ref 0.1–2)
BILIRUB UR QL: NEGATIVE
BUN BLD-MCNC: 23 MG/DL (ref 7–18)
BUN/CREAT SERPL: 20.9 (ref 10–20)
CALCIUM BLD-MCNC: 9 MG/DL (ref 8.5–10.1)
CHLORIDE SERPL-SCNC: 108 MMOL/L (ref 98–112)
CHOLEST SERPL-MCNC: 150 MG/DL (ref ?–200)
CLARITY UR: CLEAR
CO2 SERPL-SCNC: 30 MMOL/L (ref 21–32)
COLOR UR: YELLOW
COMPLEXED PSA SERPL-MCNC: 1.63 NG/ML (ref ?–4)
CREAT BLD-MCNC: 1.1 MG/DL
DEPRECATED RDW RBC AUTO: 42.1 FL (ref 35.1–46.3)
EOSINOPHIL # BLD AUTO: 0.12 X10(3) UL (ref 0–0.7)
EOSINOPHIL NFR BLD AUTO: 2.5 %
ERYTHROCYTE [DISTWIDTH] IN BLOOD BY AUTOMATED COUNT: 12.7 % (ref 11–15)
FASTING PATIENT LIPID ANSWER: YES
FASTING STATUS PATIENT QL REPORTED: YES
GFR SERPLBLD BASED ON 1.73 SQ M-ARVRAT: 80 ML/MIN/1.73M2 (ref 60–?)
GLOBULIN PLAS-MCNC: 3.2 G/DL (ref 2.8–4.4)
GLUCOSE BLD-MCNC: 99 MG/DL (ref 70–99)
GLUCOSE UR-MCNC: NEGATIVE MG/DL
HCT VFR BLD AUTO: 47.2 %
HDLC SERPL-MCNC: 38 MG/DL (ref 40–59)
HGB BLD-MCNC: 16.4 G/DL
HGB UR QL STRIP.AUTO: NEGATIVE
IMM GRANULOCYTES # BLD AUTO: 0 X10(3) UL (ref 0–1)
IMM GRANULOCYTES NFR BLD: 0 %
KETONES UR-MCNC: NEGATIVE MG/DL
LDLC SERPL CALC-MCNC: 96 MG/DL (ref ?–100)
LEUKOCYTE ESTERASE UR QL STRIP.AUTO: NEGATIVE
LYMPHOCYTES # BLD AUTO: 1.07 X10(3) UL (ref 1–4)
LYMPHOCYTES NFR BLD AUTO: 22.2 %
MCH RBC QN AUTO: 31.6 PG (ref 26–34)
MCHC RBC AUTO-ENTMCNC: 34.7 G/DL (ref 31–37)
MCV RBC AUTO: 90.9 FL
MONOCYTES # BLD AUTO: 0.42 X10(3) UL (ref 0.1–1)
MONOCYTES NFR BLD AUTO: 8.7 %
NEUTROPHILS # BLD AUTO: 3.17 X10 (3) UL (ref 1.5–7.7)
NEUTROPHILS # BLD AUTO: 3.17 X10(3) UL (ref 1.5–7.7)
NEUTROPHILS NFR BLD AUTO: 65.8 %
NITRITE UR QL STRIP.AUTO: NEGATIVE
NONHDLC SERPL-MCNC: 112 MG/DL (ref ?–130)
OSMOLALITY SERPL CALC.SUM OF ELEC: 296 MOSM/KG (ref 275–295)
PH UR: 6 [PH] (ref 5–8)
PLATELET # BLD AUTO: 111 10(3)UL (ref 150–450)
POTASSIUM SERPL-SCNC: 4.4 MMOL/L (ref 3.5–5.1)
PROT SERPL-MCNC: 6.9 G/DL (ref 6.4–8.2)
PROT UR-MCNC: 30 MG/DL
RBC # BLD AUTO: 5.19 X10(6)UL
SODIUM SERPL-SCNC: 141 MMOL/L (ref 136–145)
SP GR UR STRIP: 1.02 (ref 1–1.03)
TRIGL SERPL-MCNC: 81 MG/DL (ref 30–149)
TSI SER-ACNC: 1.34 MIU/ML (ref 0.36–3.74)
UROBILINOGEN UR STRIP-ACNC: <2
VIT C UR-MCNC: NEGATIVE MG/DL
VLDLC SERPL CALC-MCNC: 13 MG/DL (ref 0–30)
WBC # BLD AUTO: 4.8 X10(3) UL (ref 4–11)

## 2022-10-04 PROCEDURE — 80053 COMPREHEN METABOLIC PANEL: CPT

## 2022-10-04 PROCEDURE — 80061 LIPID PANEL: CPT

## 2022-10-04 PROCEDURE — 81001 URINALYSIS AUTO W/SCOPE: CPT

## 2022-10-04 PROCEDURE — 36415 COLL VENOUS BLD VENIPUNCTURE: CPT

## 2022-10-04 PROCEDURE — 93010 ELECTROCARDIOGRAM REPORT: CPT | Performed by: FAMILY MEDICINE

## 2022-10-04 PROCEDURE — 84443 ASSAY THYROID STIM HORMONE: CPT

## 2022-10-04 PROCEDURE — 93005 ELECTROCARDIOGRAM TRACING: CPT

## 2022-10-04 PROCEDURE — 85025 COMPLETE CBC W/AUTO DIFF WBC: CPT

## 2022-11-01 ENCOUNTER — OFFICE VISIT (OUTPATIENT)
Dept: FAMILY MEDICINE CLINIC | Facility: CLINIC | Age: 52
End: 2022-11-01
Payer: COMMERCIAL

## 2022-11-01 VITALS
HEIGHT: 76 IN | WEIGHT: 226 LBS | BODY MASS INDEX: 27.52 KG/M2 | DIASTOLIC BLOOD PRESSURE: 78 MMHG | HEART RATE: 58 BPM | SYSTOLIC BLOOD PRESSURE: 127 MMHG

## 2022-11-01 DIAGNOSIS — I10 HYPERTENSION, UNSPECIFIED TYPE: Primary | ICD-10-CM

## 2022-11-01 PROCEDURE — 3074F SYST BP LT 130 MM HG: CPT | Performed by: FAMILY MEDICINE

## 2022-11-01 PROCEDURE — 3008F BODY MASS INDEX DOCD: CPT | Performed by: FAMILY MEDICINE

## 2022-11-01 PROCEDURE — 3078F DIAST BP <80 MM HG: CPT | Performed by: FAMILY MEDICINE

## 2022-11-01 PROCEDURE — 99213 OFFICE O/P EST LOW 20 MIN: CPT | Performed by: FAMILY MEDICINE

## 2022-11-01 NOTE — PROGRESS NOTES
Blood pressure 127/78, pulse 58, height 6' 4\" (1.93 m), weight 226 lb (102.5 kg).     Following up for hypertension discontinued his caffeine intake would like to stop hypertensive medication    Objective comfortable no apparent distress    Assessment hypertension controlled    Plan discontinue losartan follow-up 1 to 2 weeks

## 2023-01-23 ENCOUNTER — LAB ENCOUNTER (OUTPATIENT)
Dept: LAB | Age: 53
End: 2023-01-23
Attending: FAMILY MEDICINE
Payer: COMMERCIAL

## 2023-01-23 ENCOUNTER — EKG ENCOUNTER (OUTPATIENT)
Dept: LAB | Age: 53
End: 2023-01-23
Attending: FAMILY MEDICINE
Payer: COMMERCIAL

## 2023-01-23 ENCOUNTER — OFFICE VISIT (OUTPATIENT)
Dept: FAMILY MEDICINE CLINIC | Facility: CLINIC | Age: 53
End: 2023-01-23

## 2023-01-23 VITALS
BODY MASS INDEX: 28.37 KG/M2 | HEIGHT: 76 IN | HEART RATE: 61 BPM | WEIGHT: 233 LBS | DIASTOLIC BLOOD PRESSURE: 76 MMHG | SYSTOLIC BLOOD PRESSURE: 130 MMHG

## 2023-01-23 DIAGNOSIS — Z01.818 PREOP EXAMINATION: Primary | ICD-10-CM

## 2023-01-23 DIAGNOSIS — Z01.818 PREOP EXAMINATION: ICD-10-CM

## 2023-01-23 LAB
ALBUMIN SERPL-MCNC: 3.7 G/DL (ref 3.4–5)
ALBUMIN/GLOB SERPL: 1 {RATIO} (ref 1–2)
ALP LIVER SERPL-CCNC: 57 U/L
ALT SERPL-CCNC: 34 U/L
ANION GAP SERPL CALC-SCNC: 2 MMOL/L (ref 0–18)
APTT PPP: 27.3 SECONDS (ref 23.3–35.6)
AST SERPL-CCNC: 26 U/L (ref 15–37)
BASOPHILS # BLD AUTO: 0.03 X10(3) UL (ref 0–0.2)
BASOPHILS NFR BLD AUTO: 0.6 %
BILIRUB SERPL-MCNC: 0.6 MG/DL (ref 0.1–2)
BILIRUB UR QL: NEGATIVE
BUN BLD-MCNC: 27 MG/DL (ref 7–18)
BUN/CREAT SERPL: 22.5 (ref 10–20)
CALCIUM BLD-MCNC: 9.1 MG/DL (ref 8.5–10.1)
CHLORIDE SERPL-SCNC: 107 MMOL/L (ref 98–112)
CLARITY UR: CLEAR
CO2 SERPL-SCNC: 31 MMOL/L (ref 21–32)
COLOR UR: YELLOW
CREAT BLD-MCNC: 1.2 MG/DL
DEPRECATED RDW RBC AUTO: 41.9 FL (ref 35.1–46.3)
EOSINOPHIL # BLD AUTO: 0.08 X10(3) UL (ref 0–0.7)
EOSINOPHIL NFR BLD AUTO: 1.7 %
ERYTHROCYTE [DISTWIDTH] IN BLOOD BY AUTOMATED COUNT: 12.7 % (ref 11–15)
FASTING STATUS PATIENT QL REPORTED: NO
GFR SERPLBLD BASED ON 1.73 SQ M-ARVRAT: 72 ML/MIN/1.73M2 (ref 60–?)
GLOBULIN PLAS-MCNC: 3.6 G/DL (ref 2.8–4.4)
GLUCOSE BLD-MCNC: 108 MG/DL (ref 70–99)
GLUCOSE UR-MCNC: NEGATIVE MG/DL
HCT VFR BLD AUTO: 47.6 %
HGB BLD-MCNC: 16.7 G/DL
HGB UR QL STRIP.AUTO: NEGATIVE
IMM GRANULOCYTES # BLD AUTO: 0.02 X10(3) UL (ref 0–1)
IMM GRANULOCYTES NFR BLD: 0.4 %
INR BLD: 0.99 (ref 0.85–1.16)
KETONES UR-MCNC: NEGATIVE MG/DL
LEUKOCYTE ESTERASE UR QL STRIP.AUTO: NEGATIVE
LYMPHOCYTES # BLD AUTO: 1.11 X10(3) UL (ref 1–4)
LYMPHOCYTES NFR BLD AUTO: 23.4 %
MCH RBC QN AUTO: 31.3 PG (ref 26–34)
MCHC RBC AUTO-ENTMCNC: 35.1 G/DL (ref 31–37)
MCV RBC AUTO: 89.3 FL
MONOCYTES # BLD AUTO: 0.34 X10(3) UL (ref 0.1–1)
MONOCYTES NFR BLD AUTO: 7.2 %
NEUTROPHILS # BLD AUTO: 3.16 X10 (3) UL (ref 1.5–7.7)
NEUTROPHILS # BLD AUTO: 3.16 X10(3) UL (ref 1.5–7.7)
NEUTROPHILS NFR BLD AUTO: 66.7 %
NITRITE UR QL STRIP.AUTO: NEGATIVE
OSMOLALITY SERPL CALC.SUM OF ELEC: 296 MOSM/KG (ref 275–295)
PH UR: 6 [PH] (ref 5–8)
PLATELET # BLD AUTO: 127 10(3)UL (ref 150–450)
POTASSIUM SERPL-SCNC: 4.4 MMOL/L (ref 3.5–5.1)
PREALB SERPL-MCNC: 29.5 MG/DL (ref 20–40)
PROT SERPL-MCNC: 7.3 G/DL (ref 6.4–8.2)
PROT UR-MCNC: NEGATIVE MG/DL
PROTHROMBIN TIME: 13 SECONDS (ref 11.6–14.8)
RBC # BLD AUTO: 5.33 X10(6)UL
SODIUM SERPL-SCNC: 140 MMOL/L (ref 136–145)
SP GR UR STRIP: 1.01 (ref 1–1.03)
UROBILINOGEN UR STRIP-ACNC: <2
VIT C UR-MCNC: NEGATIVE MG/DL
WBC # BLD AUTO: 4.7 X10(3) UL (ref 4–11)

## 2023-01-23 PROCEDURE — 81003 URINALYSIS AUTO W/O SCOPE: CPT

## 2023-01-23 PROCEDURE — 93010 ELECTROCARDIOGRAM REPORT: CPT | Performed by: INTERNAL MEDICINE

## 2023-01-23 PROCEDURE — 84134 ASSAY OF PREALBUMIN: CPT

## 2023-01-23 PROCEDURE — 3075F SYST BP GE 130 - 139MM HG: CPT | Performed by: FAMILY MEDICINE

## 2023-01-23 PROCEDURE — 85730 THROMBOPLASTIN TIME PARTIAL: CPT

## 2023-01-23 PROCEDURE — 99243 OFF/OP CNSLTJ NEW/EST LOW 30: CPT | Performed by: FAMILY MEDICINE

## 2023-01-23 PROCEDURE — 93005 ELECTROCARDIOGRAM TRACING: CPT

## 2023-01-23 PROCEDURE — 36415 COLL VENOUS BLD VENIPUNCTURE: CPT

## 2023-01-23 PROCEDURE — 85025 COMPLETE CBC W/AUTO DIFF WBC: CPT

## 2023-01-23 PROCEDURE — 3008F BODY MASS INDEX DOCD: CPT | Performed by: FAMILY MEDICINE

## 2023-01-23 PROCEDURE — 80053 COMPREHEN METABOLIC PANEL: CPT

## 2023-01-23 PROCEDURE — 85610 PROTHROMBIN TIME: CPT

## 2023-01-23 PROCEDURE — 3078F DIAST BP <80 MM HG: CPT | Performed by: FAMILY MEDICINE

## 2023-01-25 LAB
ATRIAL RATE: 54 BPM
P AXIS: 70 DEGREES
P-R INTERVAL: 202 MS
Q-T INTERVAL: 400 MS
QRS DURATION: 96 MS
QTC CALCULATION (BEZET): 379 MS
R AXIS: 32 DEGREES
T AXIS: 40 DEGREES
VENTRICULAR RATE: 54 BPM

## 2023-02-09 ENCOUNTER — MED REC SCAN ONLY (OUTPATIENT)
Dept: FAMILY MEDICINE CLINIC | Facility: CLINIC | Age: 53
End: 2023-02-09

## 2023-02-13 ENCOUNTER — OFFICE VISIT (OUTPATIENT)
Dept: FAMILY MEDICINE CLINIC | Facility: CLINIC | Age: 53
End: 2023-02-13

## 2023-02-13 VITALS
DIASTOLIC BLOOD PRESSURE: 97 MMHG | BODY MASS INDEX: 28.01 KG/M2 | SYSTOLIC BLOOD PRESSURE: 146 MMHG | HEIGHT: 76 IN | HEART RATE: 59 BPM | OXYGEN SATURATION: 97 % | WEIGHT: 230 LBS

## 2023-02-13 DIAGNOSIS — M48.061 NEUROFORAMINAL STENOSIS OF LUMBAR SPINE: Primary | ICD-10-CM

## 2023-02-13 PROCEDURE — 3008F BODY MASS INDEX DOCD: CPT | Performed by: FAMILY MEDICINE

## 2023-02-13 PROCEDURE — 99212 OFFICE O/P EST SF 10 MIN: CPT | Performed by: FAMILY MEDICINE

## 2023-02-13 PROCEDURE — 3080F DIAST BP >= 90 MM HG: CPT | Performed by: FAMILY MEDICINE

## 2023-02-13 PROCEDURE — 3077F SYST BP >= 140 MM HG: CPT | Performed by: FAMILY MEDICINE

## 2023-02-13 RX ORDER — BACLOFEN 10 MG/1
TABLET ORAL
COMMUNITY
Start: 2023-02-09

## 2023-02-13 RX ORDER — PSEUDOEPHEDRINE HCL 30 MG
100 TABLET ORAL DAILY
COMMUNITY
Start: 2023-02-09

## 2023-02-13 RX ORDER — HYDROCODONE BITARTRATE AND ACETAMINOPHEN 10; 325 MG/1; MG/1
TABLET ORAL
COMMUNITY
Start: 2023-02-09

## 2023-02-13 NOTE — PROGRESS NOTES
Blood pressure (!) 146/97, pulse 59, height 6' 4\" (1.93 m), weight 230 lb (104.3 kg), SpO2 97 %. Patient presents today following up from neurosurgery 4 days ago. Reports that he feels much better already. Was discharged home same day after lab ectomy. Was told to take bandage off.     Objective bandages removed incision clean dry and intact sutures are placed    Assessment status post laminectomy    Plan bandage removed advised patient to use waterproof bandage for showering follow-up with neurosurgery as scheduled

## 2023-10-16 ENCOUNTER — OFFICE VISIT (OUTPATIENT)
Dept: FAMILY MEDICINE CLINIC | Facility: CLINIC | Age: 53
End: 2023-10-16
Payer: COMMERCIAL

## 2023-10-16 VITALS
WEIGHT: 226 LBS | HEIGHT: 76 IN | BODY MASS INDEX: 27.52 KG/M2 | DIASTOLIC BLOOD PRESSURE: 74 MMHG | SYSTOLIC BLOOD PRESSURE: 136 MMHG | HEART RATE: 66 BPM

## 2023-10-16 DIAGNOSIS — M16.0 BILATERAL HIP JOINT ARTHRITIS: Primary | ICD-10-CM

## 2023-10-16 PROCEDURE — 3008F BODY MASS INDEX DOCD: CPT | Performed by: FAMILY MEDICINE

## 2023-10-16 PROCEDURE — 99213 OFFICE O/P EST LOW 20 MIN: CPT | Performed by: FAMILY MEDICINE

## 2023-10-16 PROCEDURE — 3075F SYST BP GE 130 - 139MM HG: CPT | Performed by: FAMILY MEDICINE

## 2023-10-16 PROCEDURE — 3078F DIAST BP <80 MM HG: CPT | Performed by: FAMILY MEDICINE

## 2023-10-16 RX ORDER — NAPROXEN 500 MG/1
500 TABLET ORAL 2 TIMES DAILY WITH MEALS
Qty: 60 TABLET | Refills: 1 | Status: SHIPPED | OUTPATIENT
Start: 2023-10-16 | End: 2023-12-15

## 2023-10-16 NOTE — PROGRESS NOTES
Blood pressure 136/74, pulse 66, height 6' 4\" (1.93 m), weight 226 lb (102.5 kg). Patient presents today reporting he was diagnosed with bilateral hip arthritis. Notices impingement type symptoms also. He is interested in a second opinion from orthopedics.     Objective bilateral hip x-rays reviewed show arthritis    Assessment #1 bilateral hip arthritis    Plan referral to orthopedics naproxen prescription follow-up if no improvement

## 2023-11-13 ENCOUNTER — HOSPITAL ENCOUNTER (OUTPATIENT)
Dept: GENERAL RADIOLOGY | Facility: HOSPITAL | Age: 53
Discharge: HOME | End: 2023-11-13
Attending: ORTHOPAEDIC SURGERY
Payer: COMMERCIAL

## 2023-11-13 ENCOUNTER — OFFICE VISIT (OUTPATIENT)
Dept: ORTHOPEDICS CLINIC | Facility: CLINIC | Age: 53
End: 2023-11-13
Payer: COMMERCIAL

## 2023-11-13 VITALS — BODY MASS INDEX: 27.52 KG/M2 | HEIGHT: 75.98 IN | WEIGHT: 226 LBS

## 2023-11-13 DIAGNOSIS — M25.552 BILATERAL HIP PAIN: ICD-10-CM

## 2023-11-13 DIAGNOSIS — M16.11 PRIMARY OSTEOARTHRITIS OF RIGHT HIP: ICD-10-CM

## 2023-11-13 DIAGNOSIS — M25.552 BILATERAL HIP PAIN: Primary | ICD-10-CM

## 2023-11-13 DIAGNOSIS — M25.551 BILATERAL HIP PAIN: ICD-10-CM

## 2023-11-13 DIAGNOSIS — M16.12 PRIMARY OSTEOARTHRITIS OF LEFT HIP: ICD-10-CM

## 2023-11-13 DIAGNOSIS — M25.551 BILATERAL HIP PAIN: Primary | ICD-10-CM

## 2023-11-13 PROCEDURE — 73523 X-RAY EXAM HIPS BI 5/> VIEWS: CPT | Performed by: ORTHOPAEDIC SURGERY

## 2023-11-13 RX ORDER — MELOXICAM 15 MG/1
15 TABLET ORAL DAILY
Qty: 30 TABLET | Refills: 0 | Status: SHIPPED | OUTPATIENT
Start: 2023-11-13

## 2023-11-20 ENCOUNTER — HOSPITAL ENCOUNTER (OUTPATIENT)
Dept: GENERAL RADIOLOGY | Facility: HOSPITAL | Age: 53
Discharge: HOME | End: 2023-11-20
Attending: ORTHOPAEDIC SURGERY
Payer: COMMERCIAL

## 2023-11-20 DIAGNOSIS — M16.12 PRIMARY OSTEOARTHRITIS OF LEFT HIP: ICD-10-CM

## 2023-11-20 PROCEDURE — 77002 NEEDLE LOCALIZATION BY XRAY: CPT | Performed by: ORTHOPAEDIC SURGERY

## 2023-11-20 PROCEDURE — 20610 DRAIN/INJ JOINT/BURSA W/O US: CPT | Performed by: ORTHOPAEDIC SURGERY

## 2023-11-20 RX ORDER — BUPIVACAINE HYDROCHLORIDE 5 MG/ML
INJECTION, SOLUTION EPIDURAL; INTRACAUDAL
Status: COMPLETED
Start: 2023-11-20 | End: 2023-11-20

## 2023-11-20 RX ORDER — IOPAMIDOL 612 MG/ML
15 INJECTION, SOLUTION INTRATHECAL
Status: COMPLETED | OUTPATIENT
Start: 2023-11-20 | End: 2023-11-20

## 2023-11-20 RX ORDER — TRIAMCINOLONE ACETONIDE 40 MG/ML
INJECTION, SUSPENSION INTRA-ARTICULAR; INTRAMUSCULAR
Status: COMPLETED
Start: 2023-11-20 | End: 2023-11-20

## 2023-12-19 RX ORDER — MELOXICAM 15 MG/1
15 TABLET ORAL DAILY
Qty: 30 TABLET | Refills: 0 | Status: SHIPPED | OUTPATIENT
Start: 2023-12-19

## 2024-03-18 PROBLEM — J06.9 VIRAL UPPER RESPIRATORY TRACT INFECTION: Status: RESOLVED | Noted: 2017-11-28 | Resolved: 2024-03-18

## 2024-03-20 ENCOUNTER — OFFICE VISIT (OUTPATIENT)
Dept: ORTHOPEDICS CLINIC | Facility: CLINIC | Age: 54
End: 2024-03-20
Payer: COMMERCIAL

## 2024-03-20 DIAGNOSIS — M16.11 PRIMARY OSTEOARTHRITIS OF RIGHT HIP: ICD-10-CM

## 2024-03-20 DIAGNOSIS — M16.12 PRIMARY OSTEOARTHRITIS OF LEFT HIP: Primary | ICD-10-CM

## 2024-03-20 PROCEDURE — 99213 OFFICE O/P EST LOW 20 MIN: CPT | Performed by: ORTHOPAEDIC SURGERY

## 2024-03-20 RX ORDER — METHYLPREDNISOLONE 4 MG/1
TABLET ORAL
Qty: 1 EACH | Refills: 0 | Status: SHIPPED | OUTPATIENT
Start: 2024-03-20

## 2024-03-20 NOTE — PROGRESS NOTES
NURSING INTAKE COMMENTS:   Chief Complaint   Patient presents with    Hip Pain     Bilat hip - Pt states he had injection with  fluoro on 11/20/23, with  3 weeks of relief. Rates pain 2/10 -pain on and off - some numbness and tingling. Pt declined shorts.       HPI: This 54 year old male presents today with complaints of recurrent pain in the left hip.  He had 3 weeks of pain relief following his previous cortisone injection.  He has tried modifying a his activity and is riding his bike more and playing hockey last.    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    Knee injury 1995    R ACL/MCL  s/p surgical repair    Laboratory examination ordered as part of a routine general medical examination 6/29/2012    Lipid screening 4/10/2012    Lipid screening 05-    Per [facility]    Psychosexual dysfunction with inhibited sexual excitement 6/29/2012    Screening PSA (prostate specific antigen) 05-    Per [facility]     Past Surgical History:   Procedure Laterality Date    ARTHROSCOPY OF JOINT UNLISTED Left     knee plica removed    COLONOSCOPY N/A 10/2/2019    Procedure: COLONOSCOPY;  Surgeon: Florentino Saavedra MD;  Location: [facility]    OTHER SURGICAL HISTORY      Nasal polyp removal    OTHER SURGICAL HISTORY      RIGHT ACL/MCL REPAIR     Current Outpatient Medications   Medication Sig Dispense Refill    Meloxicam 15 MG Oral Tab Take 1 tablet (15 mg total) by mouth daily. 30 tablet 0    Alclometasone Dipropionate 0.05 % External Ointment Apply 1 Application topically 2 (two) times daily. 60 g 1    Tadalafil (CIALIS) 20 MG Oral Tab Take 1 tablet (20 mg total) by mouth  daily as needed for Erectile Dysfunction. 36 tablet 3    Multiple Vitamin (MULTIVITAMINS OR) Take by mouth.       No Known Allergies  Family History   Problem Relation Age of Onset    Hypertension Father     Other (Other) Father         DEMENTIA    Seizure Disorder Mother     Anemia Mother     Other (Other) Mother         Lymphoma    Colon Cancer Other         Close relative       Social History     Occupational History    Occupation: self employed   Tobacco Use    Smoking status: Never    Smokeless tobacco: Never   Vaping Use    Vaping Use: Never used   Substance and Sexual Activity    Alcohol use: Yes     Alcohol/week: 10.0 standard drinks of alcohol     Types: 10 Standard drinks or equivalent per week     Comment: occasional    Drug use: No    Sexual activity: Not on file        Review of Systems:  GENERAL: feels generally well, no significant weight loss or weight gain  SKIN: no ulcerated or worrisome skin lesions  EYES:denies blurred vision or double vision  HEENT: denies new nasal congestion, sinus pain or ST  LUNGS: denies shortness of breath  CARDIOVASCULAR: denies chest pain  GI: no hematemesis, no worsening heartburn, no diarrhea  : no dysuria, no blood in urine, no difficulty urinating, no incontinence  MUSCULOSKELETAL: no other musculoskeletal complaints other than in HPI  NEURO: no numbness or tingling, no weakness or balance disorder  PSYCHE: no depression or anxiety  HEMATOLOGIC: no hx of blood dyscrasia  ENDOCRINE: no thyroid or diabetes issues  ALL/ASTHMA: no new hx of severe allergy or asthma    Physical Examination:    There were no vitals taken for this visit.  Constitutional: appears well hydrated, alert and responsive, no acute distress noted  Extremities: Slightly antalgic gait, pain with range of motion of the left hip.    Imaging: No results found.     Lab Results   Component Value Date    WBC 4.7 01/23/2023    HGB 16.7 01/23/2023    .0 (L) 01/23/2023      Lab Results   Component  Value Date     (H) 01/23/2023    BUN 27 (H) 01/23/2023    CREATSERUM 1.20 01/23/2023    GFRNAA 62 11/02/2021    GFRAA 72 11/02/2021        Assessment and Plan:  Diagnoses and all orders for this visit:    Primary osteoarthritis of left hip    Primary osteoarthritis of right hip        Assessment: He has a golf trip to [place] next month.  I ordered a cortisone injection for the left hip and gave him a Medrol Dosepak for rescue while he is away.  He also has found Voltaren gel to be helpful for the left hip.    Plan: We talked about left hip replacement.  He is going to consider doing this in the fall if he does not get adequate relief with activity modification injections and diclofenac gel over the summer.    The above note was creating using Dragon speech recognition technology. Please excuse any typos.    GIDEON JEAN MD

## 2024-04-02 ENCOUNTER — OFFICE VISIT (OUTPATIENT)
Dept: PODIATRY CLINIC | Facility: CLINIC | Age: 54
End: 2024-04-02
Payer: COMMERCIAL

## 2024-04-02 DIAGNOSIS — G60.9 IDIOPATHIC POLYNEUROPATHY: ICD-10-CM

## 2024-04-02 DIAGNOSIS — I73.00 RAYNAUD'S DISEASE WITHOUT GANGRENE: Primary | ICD-10-CM

## 2024-04-02 DIAGNOSIS — I73.9 PERIPHERAL VASCULAR DISEASE (HCC): ICD-10-CM

## 2024-04-02 PROCEDURE — 99204 OFFICE O/P NEW MOD 45 MIN: CPT | Performed by: PODIATRIST

## 2024-04-02 NOTE — PROGRESS NOTES
[facility]  Progress Note    Josiah Baker is a 54 year old male.   Chief Complaint   Patient presents with    Rash     Bilateral feet on toes - onset about 2 weeks ago when he noticed on the R 2nd toe which it seemed like a blister - no open areas - on the L he has it on the 3rd and 4th toes -has some discomfort  - no itchiness          HPI:     This is a pleasant male with PMH of lumbar laminectomy and does have numbness/tingling to his feet after.      He presents to clinic today due to right 1-3 toes rash/pain.  He states it started about 2 weeks ago.  He has been applying mupirocin ointment which has not been helping.  He denies any itchiness.         Allergies: Patient has no known allergies.   Current Outpatient Medications   Medication Sig Dispense Refill    methylPREDNISolone 4 MG Oral Tablet Therapy Pack TAKE AS PER INSTRUCTION SHEET 1 each 0    Meloxicam 15 MG Oral Tab Take 1 tablet (15 mg total) by mouth daily. 30 tablet 0    Alclometasone Dipropionate 0.05 % External Ointment Apply 1 Application topically 2 (two) times daily. 60 g 1    Tadalafil (CIALIS) 20 MG Oral Tab Take 1 tablet (20 mg total) by mouth daily as needed for Erectile Dysfunction. 36 tablet 3    Multiple Vitamin (MULTIVITAMINS OR) Take by mouth.        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    Knee injury 1995    R ACL/MCL  s/p surgical repair    Laboratory examination ordered as part of a routine general medical examination 6/29/2012    Lipid screening 4/10/2012    Lipid screening 05-    Per [facility]    Psychosexual dysfunction with inhibited sexual excitement 6/29/2012     Screening PSA (prostate specific antigen) 05-    Per [facility]      Past Surgical History:   Procedure Laterality Date    ARTHROSCOPY OF JOINT UNLISTED Left     knee plica removed    COLONOSCOPY N/A 10/2/2019    Procedure: COLONOSCOPY;  Surgeon: Florentino Saavedra MD;  Location: [facility]    OTHER SURGICAL HISTORY      Nasal polyp removal    OTHER SURGICAL HISTORY      RIGHT ACL/MCL REPAIR      Family History   Problem Relation Age of Onset    Hypertension Father     Other (Other) Father         DEMENTIA    Seizure Disorder Mother     Anemia Mother     Other (Other) Mother         Lymphoma    Colon Cancer Other         Close relative      Social History     Socioeconomic History    Marital status: Single   Occupational History    Occupation: self employed   Tobacco Use    Smoking status: Never    Smokeless tobacco: Never   Vaping Use    Vaping Use: Never used   Substance and Sexual Activity    Alcohol use: Yes     Alcohol/week: 10.0 standard drinks of alcohol     Types: 10 Standard drinks or equivalent per week     Comment: occasional    Drug use: No   Other Topics Concern    Caffeine Concern Yes     Comment: 1-2 cups daily, (coffee, soda)    Exercise Yes     Comment: 3-5 x's a week           REVIEW OF SYSTEMS:   Denies nausea, fever, chills  No calf pain  No other muscle or joint aches  Denies chest pain or shortness of breath.      EXAM:   There were no vitals taken for this visit.    Constitutional:   Patient in no apparent distress. Well kept. Of normal body habitus. Alert and oriented to person, place, and time.  Vascular Examination:  DP pulse is NP right, 2/4 left  PT pulse is 2/4 b/l  Capillary refill is adequate    Temperature warm proximally to cool distally bilateral    Integumentary Examination:   Digital hair growth is present left and is present right.    Mild patches of purpuric discoloration to distal aspect of toes 1-3 Right foot    Neurological Examination:  Monofilament (10-g)  sensation is 5/5 to right and 5/5 to left.  Sharp/dull is present to right and is present to left.  Parasthesias present both feet  Musculoskeletal Examination:  Muscle Strength is 5/5.        LABS & IMAGING:     Lab Results   Component Value Date     (H) 01/23/2023    BUN 27 (H) 01/23/2023    CREATSERUM 1.20 01/23/2023    BUNCREA 22.5 (H) 01/23/2023    ANIONGAP 2 01/23/2023    GFRAA 72 11/02/2021    GFRNAA 62 11/02/2021    CA 9.1 01/23/2023     01/23/2023    K 4.4 01/23/2023     01/23/2023    CO2 31.0 01/23/2023    OSMOCALC 296 (H) 01/23/2023        No results found for: \"EAG\", \"A1C\"     No results found.     ASSESSMENT AND PLAN:   Diagnoses and all orders for this visit:    Raynaud's disease without gangrene    Peripheral vascular disease (HCC)    Idiopathic polyneuropathy        Plan:     Neuropathy education and instructions have been provided. We reviewed and discussed the following:    -risk categories related to pts with neuropathy and foot or lower extremity complications    -daily monitoring/inspection of feet and shoes.   -regular follow up with PCP and specialty providers as recommended   -Lower extremity complications related to neuropathy were reviewed and stressed prevention.        Discussed with pt that his Right toe discoloration is most likely due to possible Raynaud's disease.      He may have some underlying PVD as well since I was unable to palpate a right DP pulse.      Educated pt on the importance of keeping his feet warm.    Ordered B/L LE arterial US    Referred pt to Rheum for further work up on possible Raynaud's disease.    He may benefit from a calcium channel blocker.    If symptoms worsen I would recommend him to see his PCP sooner to discuss possible CCB.      RTC 1-2 weeks after his arterial US for further evaluation.      No follow-ups on file.    Shai Tompkins, LUIS EDUARDO  4/2/2024

## 2024-04-05 ENCOUNTER — HOSPITAL ENCOUNTER (OUTPATIENT)
Dept: ULTRASOUND IMAGING | Facility: HOSPITAL | Age: 54
Discharge: HOME | End: 2024-04-05
Attending: PODIATRIST
Payer: COMMERCIAL

## 2024-04-05 DIAGNOSIS — I73.9 PERIPHERAL VASCULAR DISEASE (HCC): ICD-10-CM

## 2024-04-05 DIAGNOSIS — I73.00 RAYNAUD'S DISEASE WITHOUT GANGRENE: ICD-10-CM

## 2024-04-05 PROCEDURE — 93922 UPR/L XTREMITY ART 2 LEVELS: CPT | Performed by: PODIATRIST

## 2024-04-12 ENCOUNTER — HOSPITAL ENCOUNTER (OUTPATIENT)
Dept: GENERAL RADIOLOGY | Facility: HOSPITAL | Age: 54
Discharge: HOME | End: 2024-04-12
Attending: ORTHOPAEDIC SURGERY
Payer: COMMERCIAL

## 2024-04-12 DIAGNOSIS — M16.12 PRIMARY OSTEOARTHRITIS OF LEFT HIP: ICD-10-CM

## 2024-04-12 PROCEDURE — 77002 NEEDLE LOCALIZATION BY XRAY: CPT | Performed by: ORTHOPAEDIC SURGERY

## 2024-04-12 PROCEDURE — 20610 DRAIN/INJ JOINT/BURSA W/O US: CPT | Performed by: ORTHOPAEDIC SURGERY

## 2024-04-12 RX ORDER — BUPIVACAINE HYDROCHLORIDE 5 MG/ML
INJECTION, SOLUTION EPIDURAL; INTRACAUDAL
Status: COMPLETED
Start: 2024-04-12 | End: 2024-04-12

## 2024-04-12 RX ORDER — LIDOCAINE HYDROCHLORIDE 10 MG/ML
INJECTION, SOLUTION EPIDURAL; INFILTRATION; INTRACAUDAL; PERINEURAL
Status: COMPLETED
Start: 2024-04-12 | End: 2024-04-12

## 2024-04-12 RX ORDER — LIDOCAINE HYDROCHLORIDE 10 MG/ML
10 INJECTION, SOLUTION EPIDURAL; INFILTRATION; INTRACAUDAL; PERINEURAL ONCE
Status: COMPLETED | OUTPATIENT
Start: 2024-04-12 | End: 2024-04-12

## 2024-04-12 RX ORDER — TRIAMCINOLONE ACETONIDE 40 MG/ML
INJECTION, SUSPENSION INTRA-ARTICULAR; INTRAMUSCULAR
Status: COMPLETED
Start: 2024-04-12 | End: 2024-04-12

## 2024-04-12 RX ORDER — TRIAMCINOLONE ACETONIDE 40 MG/ML
40 INJECTION, SUSPENSION INTRA-ARTICULAR; INTRAMUSCULAR ONCE
Status: COMPLETED | OUTPATIENT
Start: 2024-04-12 | End: 2024-04-12

## 2024-04-12 RX ORDER — BUPIVACAINE HYDROCHLORIDE 5 MG/ML
10 INJECTION, SOLUTION EPIDURAL; INTRACAUDAL ONCE
Status: COMPLETED | OUTPATIENT
Start: 2024-04-12 | End: 2024-04-12

## 2024-04-12 RX ADMIN — BUPIVACAINE HYDROCHLORIDE 4 ML: 5 INJECTION, SOLUTION EPIDURAL; INTRACAUDAL at 10:30:00

## 2024-04-12 RX ADMIN — LIDOCAINE HYDROCHLORIDE 10 ML: 10 INJECTION, SOLUTION EPIDURAL; INFILTRATION; INTRACAUDAL; PERINEURAL at 10:30:00

## 2024-04-12 RX ADMIN — TRIAMCINOLONE ACETONIDE 40 MG: 40 INJECTION, SUSPENSION INTRA-ARTICULAR; INTRAMUSCULAR at 10:30:00

## 2024-04-16 ENCOUNTER — OFFICE VISIT (OUTPATIENT)
Dept: PODIATRY CLINIC | Facility: CLINIC | Age: 54
End: 2024-04-16
Payer: COMMERCIAL

## 2024-04-16 DIAGNOSIS — G60.9 IDIOPATHIC POLYNEUROPATHY: ICD-10-CM

## 2024-04-16 DIAGNOSIS — I73.00 RAYNAUD'S DISEASE WITHOUT GANGRENE: Primary | ICD-10-CM

## 2024-04-16 PROCEDURE — 99213 OFFICE O/P EST LOW 20 MIN: CPT | Performed by: PODIATRIST

## 2024-04-16 NOTE — PROGRESS NOTES
[facility] Podiatry  Progress Note    Josiah Baker is a 54 year old male.   Chief Complaint   Patient presents with    Raynaud's Syndrome     Bilateral feet f/u and US results - states the rash disappeared and the toes are back to normal - no pain          HPI:     This is a pleasant male with PMH of lumbar laminectomy and does have numbness/tingling to his feet after.      He presents to clinic today due to right 1-3 toes rash/pain f/u.  He denies any itchiness and states the rash has resolved.   He is here to go over US results.        Allergies: Patient has no known allergies.   Current Outpatient Medications   Medication Sig Dispense Refill    methylPREDNISolone 4 MG Oral Tablet Therapy Pack TAKE AS PER INSTRUCTION SHEET 1 each 0    Meloxicam 15 MG Oral Tab Take 1 tablet (15 mg total) by mouth daily. 30 tablet 0    Alclometasone Dipropionate 0.05 % External Ointment Apply 1 Application topically 2 (two) times daily. 60 g 1    Tadalafil (CIALIS) 20 MG Oral Tab Take 1 tablet (20 mg total) by mouth daily as needed for Erectile Dysfunction. 36 tablet 3    Multiple Vitamin (MULTIVITAMINS OR) Take by mouth.        Past Medical History:    Antrochoanal polyp    Right    Cellulitis and abscess of hand, except fingers and thumb    Cellulitis and abscess of hand, except fingers and thumb    Chronic sinusitis    Erectile dysfunction    Finger injury    metal foreign object in soft tissues of volar aspect of 5th middle phalanx.    Knee injury    R ACL/MCL  s/p surgical repair    Laboratory examination ordered as part of a routine general medical examination    Lipid screening    Lipid screening    Per [employer]    Psychosexual dysfunction with inhibited sexual excitement    Screening PSA (prostate specific antigen)    Per [employer]      Past Surgical History:   Procedure Laterality Date    Arthroscopy of joint unlisted Left     knee plica removed    Colonoscopy N/A 10/2/2019    Procedure: COLONOSCOPY;  Surgeon:  Florentino Saavedra MD;  Location: [facility]    Other surgical history      Nasal polyp removal    Other surgical history      RIGHT ACL/MCL REPAIR      Family History   Problem Relation Age of Onset    Hypertension Father     Other (Other) Father         DEMENTIA    Seizure Disorder Mother     Anemia Mother     Other (Other) Mother         Lymphoma    Colon Cancer Other         Close relative      Social History     Socioeconomic History    Marital status: Single   Occupational History    Occupation: self employed   Tobacco Use    Smoking status: Never    Smokeless tobacco: Never   Vaping Use    Vaping status: Never Used   Substance and Sexual Activity    Alcohol use: Yes     Alcohol/week: 10.0 standard drinks of alcohol     Types: 10 Standard drinks or equivalent per week     Comment: occasional    Drug use: No   Other Topics Concern    Caffeine Concern Yes     Comment: 1-2 cups daily, (coffee, soda)    Exercise Yes     Comment: 3-5 x's a week           REVIEW OF SYSTEMS:   Denies nausea, fever, chills  No calf pain  No other muscle or joint aches  Denies chest pain or shortness of breath.      EXAM:   There were no vitals taken for this visit.    Constitutional:   Patient in no apparent distress. Well kept. Of normal body habitus. Alert and oriented to person, place, and time.  Vascular Examination:  DP pulse is NP right, 2/4 left  PT pulse is 2/4 b/l  Capillary refill is adequate    Temperature warm proximally to cool distally bilateral    Integumentary Examination:   Digital hair growth is present left and is present right.    Mild patches of purpuric discoloration to distal aspect of toes 1-3 Right foot, improved    Neurological Examination:  Monofilament (10-g) sensation is 5/5 to right and 5/5 to left.  Sharp/dull is present to right and is present to left.  Parasthesias present both feet  Musculoskeletal Examination:  Muscle Strength is 5/5.        LABS & IMAGING:     Lab Results   Component Value  Date     (H) 01/23/2023    BUN 27 (H) 01/23/2023    CREATSERUM 1.20 01/23/2023    BUNCREA 22.5 (H) 01/23/2023    ANIONGAP 2 01/23/2023    GFRAA 72 11/02/2021    GFRNAA 62 11/02/2021    CA 9.1 01/23/2023     01/23/2023    K 4.4 01/23/2023     01/23/2023    CO2 31.0 01/23/2023    OSMOCALC 296 (H) 01/23/2023        No results found for: \"EAG\", \"A1C\"     No results found.     ASSESSMENT AND PLAN:   Diagnoses and all orders for this visit:    Raynaud's disease without gangrene    Idiopathic polyneuropathy          Plan:     Neuropathy education and instructions have been provided. We reviewed and discussed the following:    -risk categories related to pts with neuropathy and foot or lower extremity complications    -daily monitoring/inspection of feet and shoes.   -regular follow up with PCP and specialty providers as recommended   -Lower extremity complications related to neuropathy were reviewed and stressed prevention.        Discussed with pt that his Right toe discoloration is most likely due to possible Raynaud's disease.      US KENDRA b/l LE: 4/5/24  No evidence of arterial occlusive disease in either lower extremity.     Educated pt on the importance of keeping his feet warm.      Referred pt to Rheum for further work up on possible Raynaud's disease, he has appt on 6/13/24.    He may benefit from a calcium channel blocker.    If symptoms worsen I would recommend him to see his PCP sooner to discuss possible CCB.      RTC PRN    No follow-ups on file.    Shai Tompkins, LUIS EDUARDO  4/16/24

## 2024-05-20 ENCOUNTER — OFFICE VISIT (OUTPATIENT)
Dept: FAMILY MEDICINE CLINIC | Facility: CLINIC | Age: 54
End: 2024-05-20

## 2024-05-20 VITALS
HEIGHT: 75 IN | SYSTOLIC BLOOD PRESSURE: 129 MMHG | DIASTOLIC BLOOD PRESSURE: 81 MMHG | WEIGHT: 225 LBS | BODY MASS INDEX: 27.98 KG/M2 | HEART RATE: 89 BPM

## 2024-05-20 DIAGNOSIS — M16.0 BILATERAL HIP JOINT ARTHRITIS: Primary | ICD-10-CM

## 2024-05-20 PROCEDURE — 3079F DIAST BP 80-89 MM HG: CPT | Performed by: FAMILY MEDICINE

## 2024-05-20 PROCEDURE — 99212 OFFICE O/P EST SF 10 MIN: CPT | Performed by: FAMILY MEDICINE

## 2024-05-20 PROCEDURE — 3008F BODY MASS INDEX DOCD: CPT | Performed by: FAMILY MEDICINE

## 2024-05-20 PROCEDURE — 3074F SYST BP LT 130 MM HG: CPT | Performed by: FAMILY MEDICINE

## 2024-05-20 NOTE — PROGRESS NOTES
Blood pressure 129/81, pulse 89, height 6' 3\" (1.905 m), weight 225 lb (102.1 kg).          Presents today complaining of an abrasion on his left antecubital area.  Appears to be improving.    Objective abrasion noted superficial left antecubital    No discharge no surrounding erythema    Assessment abrasion    Plan patient will keep it covered follow-up in 3 to 4 days if no improvement

## 2024-06-13 ENCOUNTER — LAB ENCOUNTER (OUTPATIENT)
Dept: LAB | Age: 54
End: 2024-06-13
Attending: INTERNAL MEDICINE
Payer: COMMERCIAL

## 2024-06-13 ENCOUNTER — OFFICE VISIT (OUTPATIENT)
Dept: RHEUMATOLOGY | Facility: CLINIC | Age: 54
End: 2024-06-13
Payer: COMMERCIAL

## 2024-06-13 VITALS
HEART RATE: 64 BPM | OXYGEN SATURATION: 98 % | BODY MASS INDEX: 28.35 KG/M2 | RESPIRATION RATE: 16 BRPM | HEIGHT: 75 IN | WEIGHT: 228 LBS

## 2024-06-13 DIAGNOSIS — M25.552 BILATERAL HIP PAIN: ICD-10-CM

## 2024-06-13 DIAGNOSIS — M25.551 BILATERAL HIP PAIN: Primary | ICD-10-CM

## 2024-06-13 DIAGNOSIS — M25.551 BILATERAL HIP PAIN: ICD-10-CM

## 2024-06-13 DIAGNOSIS — G89.29 CHRONIC BILATERAL LOW BACK PAIN, UNSPECIFIED WHETHER SCIATICA PRESENT: ICD-10-CM

## 2024-06-13 DIAGNOSIS — M25.552 BILATERAL HIP PAIN: Primary | ICD-10-CM

## 2024-06-13 DIAGNOSIS — M54.50 CHRONIC BILATERAL LOW BACK PAIN, UNSPECIFIED WHETHER SCIATICA PRESENT: ICD-10-CM

## 2024-06-13 LAB
ALBUMIN SERPL-MCNC: 4 G/DL (ref 3.2–4.8)
ALBUMIN/GLOB SERPL: 1.4 {RATIO} (ref 1–2)
ALP LIVER SERPL-CCNC: 53 U/L
ALT SERPL-CCNC: 21 U/L
ANION GAP SERPL CALC-SCNC: 2 MMOL/L (ref 0–18)
AST SERPL-CCNC: 26 U/L (ref ?–34)
BILIRUB SERPL-MCNC: 0.6 MG/DL (ref 0.3–1.2)
BUN BLD-MCNC: 28 MG/DL (ref 9–23)
BUN/CREAT SERPL: 21.2 (ref 10–20)
CALCIUM BLD-MCNC: 9.4 MG/DL (ref 8.7–10.4)
CHLORIDE SERPL-SCNC: 108 MMOL/L (ref 98–112)
CK SERPL-CCNC: 171 U/L
CO2 SERPL-SCNC: 31 MMOL/L (ref 21–32)
CREAT BLD-MCNC: 1.32 MG/DL
CRP SERPL-MCNC: <0.4 MG/DL (ref ?–1)
DEPRECATED RDW RBC AUTO: 42.5 FL (ref 35.1–46.3)
EGFRCR SERPLBLD CKD-EPI 2021: 63 ML/MIN/1.73M2 (ref 60–?)
ERYTHROCYTE [DISTWIDTH] IN BLOOD BY AUTOMATED COUNT: 12.9 % (ref 11–15)
ERYTHROCYTE [SEDIMENTATION RATE] IN BLOOD: 3 MM/HR
FASTING STATUS PATIENT QL REPORTED: NO
GLOBULIN PLAS-MCNC: 2.9 G/DL (ref 2–3.5)
GLUCOSE BLD-MCNC: 98 MG/DL (ref 70–99)
HCT VFR BLD AUTO: 44.5 %
HGB BLD-MCNC: 15.9 G/DL
MCH RBC QN AUTO: 32.3 PG (ref 26–34)
MCHC RBC AUTO-ENTMCNC: 35.7 G/DL (ref 31–37)
MCV RBC AUTO: 90.3 FL
OSMOLALITY SERPL CALC.SUM OF ELEC: 297 MOSM/KG (ref 275–295)
PLATELET # BLD AUTO: 129 10(3)UL (ref 150–450)
POTASSIUM SERPL-SCNC: 4.3 MMOL/L (ref 3.5–5.1)
PROT SERPL-MCNC: 6.9 G/DL (ref 5.7–8.2)
RBC # BLD AUTO: 4.93 X10(6)UL
RHEUMATOID FACT SERPL-ACNC: <3.5 IU/ML (ref ?–14)
SODIUM SERPL-SCNC: 141 MMOL/L (ref 136–145)
WBC # BLD AUTO: 5.2 X10(3) UL (ref 4–11)

## 2024-06-13 PROCEDURE — 86431 RHEUMATOID FACTOR QUANT: CPT

## 2024-06-13 PROCEDURE — 36415 COLL VENOUS BLD VENIPUNCTURE: CPT

## 2024-06-13 PROCEDURE — 3008F BODY MASS INDEX DOCD: CPT | Performed by: INTERNAL MEDICINE

## 2024-06-13 PROCEDURE — 80053 COMPREHEN METABOLIC PANEL: CPT

## 2024-06-13 PROCEDURE — 86038 ANTINUCLEAR ANTIBODIES: CPT

## 2024-06-13 PROCEDURE — 86140 C-REACTIVE PROTEIN: CPT

## 2024-06-13 PROCEDURE — 86200 CCP ANTIBODY: CPT

## 2024-06-13 PROCEDURE — 85652 RBC SED RATE AUTOMATED: CPT

## 2024-06-13 PROCEDURE — 81374 HLA I TYPING 1 ANTIGEN LR: CPT

## 2024-06-13 PROCEDURE — 85027 COMPLETE CBC AUTOMATED: CPT

## 2024-06-13 PROCEDURE — 82550 ASSAY OF CK (CPK): CPT

## 2024-06-13 PROCEDURE — 99244 OFF/OP CNSLTJ NEW/EST MOD 40: CPT | Performed by: INTERNAL MEDICINE

## 2024-06-13 RX ORDER — MELOXICAM 15 MG/1
15 TABLET ORAL DAILY
Qty: 30 TABLET | Refills: 3 | Status: SHIPPED | OUTPATIENT
Start: 2024-06-13

## 2024-06-13 NOTE — PROGRESS NOTES
Josiah Baker is a 54 year old male who presents for   Chief Complaint   Patient presents with    Consult     Raynaud's disease without gangrene- Mild patches of purpuric discoloration to distal aspect of toes 1-3 Right foot, Idiopathic polyneuropathy AND Bilateral hip joint arthritis   .   HPI:     I had the pleasure of seeing Josiah Baker on 6/13/2024 for evaluation.     He is a pleasant 54 year old who who had a rash and is referred for Raynaud's.   He had a rash on his feet 1 year ago - and tgiven a topical abx and then it improved. It lasted for 2 weeks. It started with one toe and spread to 3-4 toes.   Then about 2 month ago it occurred again around 4/2024 - for 4-5 days - two weeks later it was gone.   His feet are cold. But he never has color changes.     He has ongoing bilateral hip arthriits.   He had lumbar sx 1 year ago   He has had right foot numbness for 30 years.   Then 4 years ago he started having left foot numbneess - in summer of 2020. = more numb in left foot than his right foot -   Dr. Tomas  did a laminectomy in 2023 and his back was better.   He also noticed that he was having arthritis in his hips after the surgery.   In the morning his hips feel better.   He has no range of motion in his hips.    That's what hurt.s   The shins also hurts . This is just the symptoms he has.       He got injections in his left hip with dr. Aguayo , ortho in 4/2024 -for golf trip - and was able to walk for 6 days -   He also had shots in left hip /2023 - lasted in 1 month  Given a steroid сергей - but never needed it   Can't flex his left hp.     He plays hockey as well - and thought it might be bad for his hips.   He bikes now more - but wondering if he needs hip replacements.     No achilles tendonitis.   No plantar fascitis - told it was metatarsalgia - and wearing orthotics and arch supports and that' shelps about 4-5 years ago    He has about 3/10 pain in his hips with walking.   It's mostly  lack of  range of motion and not able to stand for a long time b/c of left hip   Sitting he has no pain     Had right knee mcl replaced, surgery on left knee plica removal -, back surgery 1 1/2 year ago,   Pulled back in 20s - with hx of neuropathy in both legs - got feeling back except right 5th toe - was staying numb over the last 30 years. -     His back doesn't hrut at night.   Sometiems his right hip can grind and it's painful to stand up. Then easing up into - the pain can go away.   In general his joitn pain comes and goes - it's constant but it's overall ton when he gets up and out of bed.   No morning stiffness   He was seeing a chriopracter - when he was having his back pain and told his hip flexors wer right to protect his back.     Takign aleve taking 500mg and 600mg ad ay - for hips now     Wt Readings from Last 2 Encounters:   06/13/24 228 lb (103.4 kg)   05/20/24 225 lb (102.1 kg)     Body mass index is 28.5 kg/m².      Current Outpatient Medications   Medication Sig Dispense Refill    Alclometasone Dipropionate 0.05 % External Ointment Apply 1 Application topically 2 (two) times daily. 60 g 1    Tadalafil (CIALIS) 20 MG Oral Tab Take 1 tablet (20 mg total) by mouth daily as needed for Erectile Dysfunction. 36 tablet 3    Multiple Vitamin (MULTIVITAMINS OR) Take by mouth.      Meloxicam 15 MG Oral Tab Take 1 tablet (15 mg total) by mouth daily. 30 tablet 0      Past Medical History:    Antrochoanal polyp    Right    Cellulitis and abscess of hand, except fingers and thumb    Cellulitis and abscess of hand, except fingers and thumb    Chronic sinusitis    Erectile dysfunction    Finger injury    metal foreign object in soft tissues of volar aspect of 5th middle phalanx.    Knee injury    R ACL/MCL  s/p surgical repair    Laboratory examination ordered as part of a routine general medical examination    Lipid screening    Lipid screening    Per [employer]    Psychosexual dysfunction with inhibited sexual  excitement    Screening PSA (prostate specific antigen)    Per [employer]      Past Surgical History:   Procedure Laterality Date    Arthroscopy of joint unlisted Left     knee plica removed    Colonoscopy N/A 10/2/2019    Procedure: COLONOSCOPY;  Surgeon: Florentino Saavedra MD;  Location: [facility]    Other surgical history      Nasal polyp removal    Other surgical history      RIGHT ACL/MCL REPAIR      Family History   Problem Relation Age of Onset    Hypertension Father     Other (Other) Father         DEMENTIA    Seizure Disorder Mother     Anemia Mother     Other (Other) Mother         Lymphoma    Colon Cancer Other         Close relative      Social History: 3 siblings - sister - had arthritis on the top of her neck -                             Social History     Socioeconomic History    Marital status: Single   Occupational History    Occupation: self employed   Tobacco Use    Smoking status: Never    Smokeless tobacco: Never   Vaping Use    Vaping status: Never Used   Substance and Sexual Activity    Alcohol use: Yes     Alcohol/week: 10.0 standard drinks of alcohol     Types: 10 Standard drinks or equivalent per week     Comment: occasional    Drug use: No   Other Topics Concern    Caffeine Concern Yes     Comment: 1-2 cups daily, (coffee, soda)    Exercise Yes     Comment: 3-5 x's a week   Social History Narrative    Going to [facility] on business. 9/1/210             Social Determinants of Health     Financial Resource Strain: Not on File (6/7/2023)    Received from [facility]     Financial Resource Strain     Financial Resource Strain: 0   Food Insecurity: Not on File (6/7/2023)    Received from [facility]     Food Insecurity     Food: 0   Transportation Needs: Not on File (6/7/2023)    Received from [facility]     Transportation Needs     Transportation: 0   Physical Activity: Not on File (6/7/2023)    Received from [facility]     Physical Activity     Physical Activity: 0   Stress: Not on  File (2023)    Received from [facility]     Stress     Stress: 0   Social Connections: Not on File (2023)    Received from [facility]     Social Connections     Social Connections and Isolation: 0   Housing Stability: Not on File (2023)    Received from [facility]     Housing Stability     Housin      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   Social drinker - no drink for 2 weeks, now if he plays hockey he can dring 6-7   No children   Played hockey for 35 years      REVIEW OF SYSTEMS:   Review Of Systems:  Fatigue  Constitutional:No fever, no change in weight or appetitie  Derm: No rashes, no oral ulcers, no alopecia, no photosensitivity, no psoriasis  HEENT: No dry eyes, no dry mouth, no Raynaud's, no nasal ulcers, no parotid swelling, no neck pain, no jaw pain, no temple pain  Eyes: No visual changes,   CVS: No chest pain, no heart disease  RS: No SOB, no Cough, No Pleurtic pain,   GI: No nausea, no vomiiting, no abominal pain, no hx of ulcer, no gastritis, no heartburn, no dysphagia, no BRBPR or melena  : no dysuria, no hx of miscarriages, no DVT Hx, no hx of OCP,   Neuro: No numbness or tingling, no headache, no hx of seizures,   Psych: no hx of anxiety or depression  ENDO: no hx of thyroid disease, no hx of DM  Joint/Muscluskeltal: see HPI,   All other ROS are negative.     EXAM:   Pulse 64   Resp 16   Ht 6' 3\" (1.905 m)   Wt 228 lb (103.4 kg)   SpO2 98%   BMI 28.50 kg/m²   HEENT: Clear oropharynx, no oral ulcers, EOM intact, clear sclera, PERRLA, pleasant, no acute distress, no CAD,   No rashes  CVS: RRR, no murmurs  RS: CTAB, no crackles, no rhonchi  ABD: Soft Non tender, no HSM felt, BS positive  Joint exam:   no neck tendnerness, good ROM,   Severe decreased limited rom of hips - about 10-30 degrees bilaterally -   Not tender in b/l hips   No si joint tendenrss at this time   No knee tenderness  Good cap refill  in toes   No  tenderness in mtps -   EXTREMITIES: no cyanosis, clubbing or edema  NEURO: intact touch, 5/5 ue and le strength      11/13/2023 - bilat hip xray   Mild bilateral hip OA     ASSESSMENT AND PLAN:   Josiah Baker is a 54 year old male who presents for   Chief Complaint   Patient presents with    Consult     Raynaud's disease without gangrene- Mild patches of purpuric discoloration to distal aspect of toes 1-3 Right foot, Idiopathic polyneuropathy AND Bilateral hip joint arthritis       Bilateral hip - pain and polyarthralgia with severe limitation of range of motion   - osteoarthritis of hips but unusual for his age   Could be from all sports injuries in the past -   Rule out inflammatory arthriaits - RA and seronegative spondylitriits   - no hx of psoriass , or IBD   Check labs to evaluate for inflammatory arthritis and/or connective tissue disease   - in the past meloxicam helped him more - will switch him to this   - he is going to see ortho , dr. Aguayo in the future in August to discuss hip replacements   Stop aleve  If anything positive - will call him     Summary:  Check labs   Try meloxicam 15mg a day   Return to clinic if anything is positive       Melchor Chaidez MD  6/13/2024  1:47 PM

## 2024-06-14 LAB — CCP IGG SERPL-ACNC: 1 U/ML (ref 0–6.9)

## 2024-06-17 LAB — NUCLEAR IGG TITR SER IF: NEGATIVE {TITER}

## 2024-06-24 LAB — HLA-B27: NEGATIVE

## 2024-07-01 ENCOUNTER — LAB ENCOUNTER (OUTPATIENT)
Dept: LAB | Age: 54
End: 2024-07-01
Attending: UROLOGY

## 2024-07-01 ENCOUNTER — OFFICE VISIT (OUTPATIENT)
Dept: DERMATOLOGY CLINIC | Facility: CLINIC | Age: 54
End: 2024-07-01
Payer: COMMERCIAL

## 2024-07-01 DIAGNOSIS — L81.4 LENTIGINES: ICD-10-CM

## 2024-07-01 DIAGNOSIS — Z11.3 SCREENING EXAMINATION FOR VENEREAL DISEASE: Primary | ICD-10-CM

## 2024-07-01 DIAGNOSIS — D22.9 MULTIPLE BENIGN NEVI: ICD-10-CM

## 2024-07-01 DIAGNOSIS — D49.2 NEOPLASM OF UNSPECIFIED BEHAVIOR OF BONE, SOFT TISSUE, AND SKIN: Primary | ICD-10-CM

## 2024-07-01 LAB — T PALLIDUM AB SER QL IA: REACTIVE

## 2024-07-01 PROCEDURE — 99203 OFFICE O/P NEW LOW 30 MIN: CPT | Performed by: STUDENT IN AN ORGANIZED HEALTH CARE EDUCATION/TRAINING PROGRAM

## 2024-07-01 PROCEDURE — 86780 TREPONEMA PALLIDUM: CPT

## 2024-07-01 PROCEDURE — 86592 SYPHILIS TEST NON-TREP QUAL: CPT

## 2024-07-01 PROCEDURE — 36415 COLL VENOUS BLD VENIPUNCTURE: CPT

## 2024-07-01 NOTE — PROGRESS NOTES
July 1, 2024    Established patient     CHIEF COMPLAINT: Cyst     HISTORY OF PRESENT ILLNESS: .    1. Cyst  Location: Back  Duration: 20 Years  Signs and symptoms: Discomfort   Current treatment: None  Past treatments: None       DERM HISTORY:  History of skin cancer: No  History of chronic skin disease/condition: No    FAMILY HISTORY:  History of melanoma: No  History of chronic skin disease/condition: No    History/Other:    REVIEW OF SYSTEMS:  Constitutional: Denies fever, chills, unintentional weight loss.   Skin as per HPI    PAST MEDICAL HISTORY:  Past Medical History:    Antrochoanal polyp    Right    Cellulitis and abscess of hand, except fingers and thumb    Cellulitis and abscess of hand, except fingers and thumb    Chronic sinusitis    Erectile dysfunction    Finger injury    metal foreign object in soft tissues of volar aspect of 5th middle phalanx.    Knee injury    R ACL/MCL  s/p surgical repair    Laboratory examination ordered as part of a routine general medical examination    Lipid screening    Lipid screening    Per [facility]    Psychosexual dysfunction with inhibited sexual excitement    Screening PSA (prostate specific antigen)    Per [facility]       Medications  Current Outpatient Medications   Medication Sig Dispense Refill    Meloxicam 15 MG Oral Tab Take 1 tablet (15 mg total) by mouth daily. 30 tablet 3    Alclometasone Dipropionate 0.05 % External Ointment Apply 1 Application topically 2 (two) times daily. 60 g 1    Tadalafil (CIALIS) 20 MG Oral Tab Take 1 tablet (20 mg total) by mouth daily as needed for Erectile Dysfunction. 36 tablet 3    Multiple Vitamin (MULTIVITAMINS OR) Take by mouth.         Objective:    PHYSICAL EXAM:  General: awake, alert, no acute distress  Skin: Skin exam was performed today including the following: back. Pertinent findings include:   - with stellate brown macules  - with regular appearing brown macules and papules  - with a large subcutaneous  nodule    ASSESSMENT & PLAN:  Pathophysiology of diagnoses discussed with patient.  Therapeutic options reviewed. Risks, benefits, and alternatives discussed with patient. Instructions reviewed at length.    #Lentigines  - Discussed benign appearance and provided reassurance. No treatment but observation at this time. Follow-up for concerning physical changes or new symptoms.  - Recommend sun protection with spf 30 or higher, sun protective clothing such as wide brimmed hats and long sleeves. Recommend avoiding midday sun (10 am- 3 pm).     #Multiple benign nevi  - Reassured patient of benign nature of these lesions.   - Return for lesions that are new, growing, changing or symptomatic.   - Recommend daily photoprotection with broad-spectrum sunscreen (SPF 30 daily with reapplication every 2 hours), avoidance of sun during peak hours, and sun protective clothing.   - Dermoscopy was used for physical examination of pigmented lesions during today's office visit.     #Neoplasm   - Referred to Dr. Cabrera for excision     Return to clinic: as needed or sooner if something concerning arises     Portillo Fulton MD

## 2024-07-03 LAB — RPR SER QL: NONREACTIVE

## 2024-07-09 LAB — TREPONEMAL ANTIBODIES, TPPA: REACTIVE

## 2024-07-12 ENCOUNTER — OFFICE VISIT (OUTPATIENT)
Dept: FAMILY MEDICINE CLINIC | Facility: CLINIC | Age: 54
End: 2024-07-12
Payer: COMMERCIAL

## 2024-07-12 VITALS
BODY MASS INDEX: 27.73 KG/M2 | DIASTOLIC BLOOD PRESSURE: 80 MMHG | WEIGHT: 223 LBS | SYSTOLIC BLOOD PRESSURE: 146 MMHG | HEART RATE: 73 BPM | HEIGHT: 75 IN

## 2024-07-12 DIAGNOSIS — A52.8 LATE LATENT SYPHILIS: Primary | ICD-10-CM

## 2024-07-12 RX ORDER — EMTRICITABINE AND TENOFOVIR DISOPROXIL FUMARATE 200; 300 MG/1; MG/1
1 TABLET, FILM COATED ORAL DAILY
COMMUNITY
Start: 2024-07-03

## 2024-07-12 NOTE — PROGRESS NOTES
Blood pressure (!) 165/80, pulse 73, height 6' 3\" (1.905 m), weight 223 lb (101.2 kg).          Following up for positive treponemal results and positive tPPA antibodies for syphilis.  Reports that he was treated successfully for syphilis in 2017 without any positive since that time.  He has multiple test per year.  He reports that he has been sexually active.    Most recently 2 months ago.  He denies any rashes or dysuria.    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

## 2024-07-18 ENCOUNTER — PATIENT MESSAGE (OUTPATIENT)
Dept: FAMILY MEDICINE CLINIC | Facility: CLINIC | Age: 54
End: 2024-07-18

## 2024-07-18 ENCOUNTER — TELEPHONE (OUTPATIENT)
Dept: FAMILY MEDICINE CLINIC | Facility: CLINIC | Age: 54
End: 2024-07-18

## 2024-07-18 DIAGNOSIS — A52.8 LATE LATENT SYPHILIS: Primary | ICD-10-CM

## 2024-07-18 NOTE — TELEPHONE ENCOUNTER
From: Josiah Baker  To: Rikki Galeana  Sent: 7/18/2024 9:49 AM CDT  Subject: Follow up appointment     Good morning Dr Galeana.  I was scheduled to receive my 2nd penicillin shot tomorrow and your office has to reschedule to July 30th.  I have read on CDC.gov that early latent syphilis is treated with 1 shot of penicillin and since I haven't had this infection more than 3 months, are the remaining shots required? If not, I assume the follow up appointment is just to check my blood pressure?

## 2024-07-18 NOTE — TELEPHONE ENCOUNTER
Confirmatory syphilis testing is positive.  Patient will need penicillin injections weekly for a total of 3 doses of penicillin..

## 2024-07-19 ENCOUNTER — NURSE ONLY (OUTPATIENT)
Dept: FAMILY MEDICINE CLINIC | Facility: CLINIC | Age: 54
End: 2024-07-19
Payer: COMMERCIAL

## 2024-07-19 DIAGNOSIS — A52.8 LATE LATENT SYPHILIS: Primary | ICD-10-CM

## 2024-07-19 NOTE — TELEPHONE ENCOUNTER
Please disregard.   Patient seen today.  And informed ok to wait until 7/30 for last dose of penicillin per .  No further action required.

## 2024-07-30 ENCOUNTER — OFFICE VISIT (OUTPATIENT)
Dept: FAMILY MEDICINE CLINIC | Facility: CLINIC | Age: 54
End: 2024-07-30
Payer: COMMERCIAL

## 2024-07-30 VITALS
HEART RATE: 71 BPM | SYSTOLIC BLOOD PRESSURE: 125 MMHG | BODY MASS INDEX: 28.35 KG/M2 | RESPIRATION RATE: 17 BRPM | HEIGHT: 75 IN | DIASTOLIC BLOOD PRESSURE: 75 MMHG | WEIGHT: 228 LBS

## 2024-07-30 DIAGNOSIS — A51.5 EARLY SYPHILIS, LATENT: Primary | ICD-10-CM

## 2024-07-30 DIAGNOSIS — M62.561 ATROPHY OF MUSCLE OF RIGHT LOWER LEG: ICD-10-CM

## 2024-07-30 PROCEDURE — 3074F SYST BP LT 130 MM HG: CPT | Performed by: FAMILY MEDICINE

## 2024-07-30 PROCEDURE — 3008F BODY MASS INDEX DOCD: CPT | Performed by: FAMILY MEDICINE

## 2024-07-30 PROCEDURE — 3078F DIAST BP <80 MM HG: CPT | Performed by: FAMILY MEDICINE

## 2024-07-30 PROCEDURE — 99213 OFFICE O/P EST LOW 20 MIN: CPT | Performed by: FAMILY MEDICINE

## 2024-07-30 PROCEDURE — 96372 THER/PROPH/DIAG INJ SC/IM: CPT | Performed by: FAMILY MEDICINE

## 2024-07-30 NOTE — PROGRESS NOTES
Blood pressure 125/75, pulse 71, resp. rate 17, height 6' 3\" (1.905 m), weight 228 lb (103.4 kg).          Following up for early latent syphilis.  Also with degenerative hip arthritis.    Objective comfortable no apparent distress    Assessment hip arthritis with early latent syphilis and leg muscle atrophy    Plan penicillin injection received fasting blood test ordered follow-up for physical as scheduled

## 2024-08-01 ENCOUNTER — TELEPHONE (OUTPATIENT)
Dept: FAMILY MEDICINE CLINIC | Facility: CLINIC | Age: 54
End: 2024-08-01

## 2024-08-01 NOTE — TELEPHONE ENCOUNTER
Calling to follow up on lab 6/17/24 and 7/1/24    Reactive antibodies , advise was given 3 penicillin injections, verbalized understanding

## 2024-08-19 ENCOUNTER — OFFICE VISIT (OUTPATIENT)
Dept: SURGERY | Facility: CLINIC | Age: 54
End: 2024-08-19
Payer: COMMERCIAL

## 2024-08-19 DIAGNOSIS — D21.3 BENIGN NEOPLASM OF CONNECTIVE AND SOFT TISSUE OF THORAX: Primary | ICD-10-CM

## 2024-08-19 PROCEDURE — 99244 OFF/OP CNSLTJ NEW/EST MOD 40: CPT | Performed by: PLASTIC SURGERY

## 2024-08-19 RX ORDER — HYDROCODONE BITARTRATE AND ACETAMINOPHEN 7.5; 325 MG/1; MG/1
1 TABLET ORAL
Qty: 10 TABLET | Refills: 0 | Status: SHIPPED | OUTPATIENT
Start: 2024-08-19

## 2024-08-19 NOTE — H&P
Josiah Baker is a 54 year old male that presents with   Chief Complaint   Patient presents with    Cyst     Back cyst   .    REFERRED BY:  Portillo Fulton    Pacemaker: No  Latex Allergy: no  Coumadin: No  Plavix: No  Other anticoagulants: No  Diet medication: No  Cardiac stents: No    HAND DOMINANCE:  Right    Profession: Business owner    RECONSTRUCTIVE HISTORY    SUN EXPOSURE   Current no   Past no   Sunburns no   Tanning salons current no   Tanning salons past no     SKIN CANCER    Personal history of skin cancer: none      HPI:       54-year-old male with a back mass    20 years duration    Intermittent pain with pressure    It has increased in size.          Review of Systems:   Constitutional: No change in appetite, chill/rigors, or fatigue  GI: No jaundice  Endocrine: No generalized weakness  Neurological: No aphasia, loss of consciousness, or seizures       Integumentary:     LESION 1:    Location  Left upper back mass    Onset:   20 years    Pain:   yes intermittent \"pressure\" rates 5/10    Itching:   no    Bleeding:  no    Infection:  no    Size change?  increased:    Color change?  No    Biopsy?:   No    Left upper back firm mass  Pt taking cialis, will need to hold 3 days prior to surgery    PMH:     MEDICAL  Past Medical History:    Antrochoanal polyp    Right    Cellulitis and abscess of hand, except fingers and thumb    Cellulitis and abscess of hand, except fingers and thumb    Chronic sinusitis    Erectile dysfunction    Finger injury    metal foreign object in soft tissues of volar aspect of 5th middle phalanx.    Knee injury    R ACL/MCL  s/p surgical repair    Laboratory examination ordered as part of a routine general medical examination    Lipid screening    Lipid screening    Per [facility]    Psychosexual dysfunction with inhibited sexual excitement    Screening PSA (prostate specific antigen)    Per [facility]        SURGICAL  Past Surgical History:   Procedure Laterality Date     Arthroscopy of joint unlisted Left     knee plica removed    Colonoscopy N/A 10/2/2019    Procedure: COLONOSCOPY;  Surgeon: Florentino Saavedra MD;  [facility]    Other surgical history      Nasal polyp removal    Other surgical history      RIGHT ACL/MCL REPAIR        ALLERIGIES  No Known Allergies     MEDICATIONS  Current Outpatient Medications   Medication Sig Dispense Refill    emtricitabine-tenofovir 200-300 MG Oral Tab Take 1 tablet by mouth daily.      Meloxicam 15 MG Oral Tab Take 1 tablet (15 mg total) by mouth daily. 30 tablet 3    Tadalafil (CIALIS) 20 MG Oral Tab Take 1 tablet (20 mg total) by mouth daily as needed for Erectile Dysfunction. 36 tablet 3    Multiple Vitamin (MULTIVITAMINS OR) Take by mouth.          SOCIAL HISTORY  Social History     Socioeconomic History    Marital status: Single   Occupational History    Occupation: self employed   Tobacco Use    Smoking status: Never    Smokeless tobacco: Never   Vaping Use    Vaping status: Never Used   Substance and Sexual Activity    Alcohol use: Yes     Alcohol/week: 10.0 standard drinks of alcohol     Types: 10 Standard drinks or equivalent per week     Comment: occasional    Drug use: No   Other Topics Concern    Caffeine Concern Yes     Comment: 1-2 cups daily, (coffee, soda)    Exercise Yes     Comment: 3-5 x's a week    Reaction to local anesthetic No   Social History Narrative    Going to [facility] on business. 9/1/210             Social Determinants of Health     Financial Resource Strain: Not on File (6/7/2023)    Received from [facility]    Financial Resource Strain     Financial Resource Strain: 0   Food Insecurity: Not on File (6/7/2023)    Received from [facility]    Food Insecurity     Food: 0   Transportation Needs: Not on File (6/7/2023)    Received from [facility]    Transportation Needs     Transportation: 0   Physical Activity: Not on File (6/7/2023)    Received from [facility]    Physical Activity     Physical  Activity: 0   Stress: Not on File (2023)    Received from [employer]    Stress     Stress: 0   Social Connections: Not on File (2023)    Received from [facility]    Social Connections     Social Connections and Isolation: 0   Housing Stability: Not on File (2023)    Received from [facility]    Housing Stability     Housin        FAMILY HISTORY  Family History   Problem Relation Age of Onset    Hypertension Father     Other (Other) Father         DEMENTIA    Seizure Disorder Mother     Anemia Mother     Other (Other) Mother         Lymphoma    Colon Cancer Other         Close relative          PHYSICAL EXAM:     CONSTITUTIONAL: Overall appearance - Normal  HEENT: Normocephalic  EYES: Conjunctiva - Right: Normal, Left: Normal; EOMI  EARS: Inspection - Right: Normal, Left: Normal  NECK/THYROID: Inspection - Normal, Palpation - Normal, Thyroid gland - Normal, No adenopathy  RESPIRATORY: Inspection - Normal, Effort - Normal  CARDIOVASCULAR: Regular rhythm, No murmurs  ABDOMEN: Inspection - Normal, No abdominal tenderness  NEURO: Memory intact  PSYCH: Oriented to person, place, time, and situation, Appropriate mood and affect      Integument Physical Exam:       Left upper back, medial parascapular: 3 cm firm, freely movable lipomatous mass      ASSESSMENT/PLAN:     IMPRESSION:    Back mass        We had a long discussion.  I explained to the patient the nature of this lesion / these lesions, as well as treatment options.    EXCISION: This lesion should be excised.  This will result in a permanent scar, which will be longer than the size of the lesion.  Further excision may be necessary, if tumor is present microscopically on the pathology report.  This may result in a longer scar.  Recurrence may occur, which will require further surgery.         TREATMENT:    Questions were answered and the patient wishes to proceed with treatment.    Excision    RISKS:     Bleeding  Infection  Scar  Need for  further surgery  Anesthesia risks               8/19/2024  Brown Cabrera MD    [facility] Data Reviewed.               +++++++++++++++++++++++++++++++++++++++++++++++++    MEDICAL DECISION MAKING    PROBLEMS      MODERATE    (number / complexity)          Undiagnosed new problem with uncertain prognosis    DATA         STRAIGHTFORWARD    (amount / complexity)           MANAGEMENT RISK  HIGH    (complications/ morbidity)       Major surgery with risk factors                  [facility] LEVEL    MODERATE

## 2024-08-19 NOTE — PROGRESS NOTES
Patient request for surgery signed by patient and witnessed and signed by RN.  Prescription for Norco 7.5 mg electronically sent to pharmacy per Dr. Cabrera's order and patient instructed to  prescription before surgery.   Pre-Surgical Instruction Handout given to and reviewed w/patient.  Pt instructed to hold cialis medication 72 hours prior to surgery date.   Pt instructed to hold OTC vitamins and supplements one week prior to surgery.  All questions and concerns answered; pt verbalized an understanding of all pre-operative teaching.  Patient instructed to call the office with any further questions and/or concerns.  Patient escorted to surgery scheduling to schedule surgery and post-operative appointments.

## 2024-08-19 NOTE — H&P (VIEW-ONLY)
Josiah Baker is a 54 year old male that presents with   Chief Complaint   Patient presents with    Cyst     Back cyst   .    REFERRED BY:  Portillo Fulton    Pacemaker: No  Latex Allergy: no  Coumadin: No  Plavix: No  Other anticoagulants: No  Diet medication: No  Cardiac stents: No    HAND DOMINANCE:  Right    Profession: Business owner    RECONSTRUCTIVE HISTORY    SUN EXPOSURE   Current no   Past no   Sunburns no   Tanning salons current no   Tanning salons past no     SKIN CANCER    Personal history of skin cancer: none      HPI:       54-year-old male with a back mass    20 years duration    Intermittent pain with pressure    It has increased in size.          Review of Systems:   Constitutional: No change in appetite, chill/rigors, or fatigue  GI: No jaundice  Endocrine: No generalized weakness  Neurological: No aphasia, loss of consciousness, or seizures       Integumentary:     LESION 1:    Location  Left upper back mass    Onset:   20 years    Pain:   yes intermittent \"pressure\" rates 5/10    Itching:   no    Bleeding:  no    Infection:  no    Size change?  increased:    Color change?  No    Biopsy?:   No    Left upper back firm mass  Pt taking cialis, will need to hold 3 days prior to surgery    PMH:     MEDICAL  Past Medical History:    Antrochoanal polyp    Right    Cellulitis and abscess of hand, except fingers and thumb    Cellulitis and abscess of hand, except fingers and thumb    Chronic sinusitis    Erectile dysfunction    Finger injury    metal foreign object in soft tissues of volar aspect of 5th middle phalanx.    Knee injury    R ACL/MCL  s/p surgical repair    Laboratory examination ordered as part of a routine general medical examination    Lipid screening    Lipid screening    Per [facility]    Psychosexual dysfunction with inhibited sexual excitement    Screening PSA (prostate specific antigen)    Per [facility]        SURGICAL  Past Surgical History:   Procedure Laterality Date     Arthroscopy of joint unlisted Left     knee plica removed    Colonoscopy N/A 10/2/2019    Procedure: COLONOSCOPY;  Surgeon: Florentino Saavedra MD;  Location: [facility]    Other surgical history      Nasal polyp removal    Other surgical history      RIGHT ACL/MCL REPAIR        ALLERIGIES  No Known Allergies     MEDICATIONS  Current Outpatient Medications   Medication Sig Dispense Refill    emtricitabine-tenofovir 200-300 MG Oral Tab Take 1 tablet by mouth daily.      Meloxicam 15 MG Oral Tab Take 1 tablet (15 mg total) by mouth daily. 30 tablet 3    Tadalafil (CIALIS) 20 MG Oral Tab Take 1 tablet (20 mg total) by mouth daily as needed for Erectile Dysfunction. 36 tablet 3    Multiple Vitamin (MULTIVITAMINS OR) Take by mouth.          SOCIAL HISTORY  Social History     Socioeconomic History    Marital status: Single   Occupational History    Occupation: self employed   Tobacco Use    Smoking status: Never    Smokeless tobacco: Never   Vaping Use    Vaping status: Never Used   Substance and Sexual Activity    Alcohol use: Yes     Alcohol/week: 10.0 standard drinks of alcohol     Types: 10 Standard drinks or equivalent per week     Comment: occasional    Drug use: No   Other Topics Concern    Caffeine Concern Yes     Comment: 1-2 cups daily, (coffee, soda)    Exercise Yes     Comment: 3-5 x's a week    Reaction to local anesthetic No   Social History Narrative    Going to [facility] on business. 9/1/210             Social Determinants of Health     Financial Resource Strain: Not on File (6/7/2023)    Received from [facility]    Financial Resource Strain     Financial Resource Strain: 0   Food Insecurity: Not on File (6/7/2023)    Received from [facility]    Food Insecurity     Food: 0   Transportation Needs: Not on File (6/7/2023)    Received from [facility]    Transportation Needs     Transportation: 0   Physical Activity: Not on File (6/7/2023)    Received from [facility]    Physical Activity     Physical  Activity: 0   Stress: Not on File (2023)    Received from [employer]    Stress     Stress: 0   Social Connections: Not on File (2023)    Received from [employer]    Social Connections     Social Connections and Isolation: 0   Housing Stability: Not on File (2023)    Received from [facility]    Housing Stability     Housin        FAMILY HISTORY  Family History   Problem Relation Age of Onset    Hypertension Father     Other (Other) Father         DEMENTIA    Seizure Disorder Mother     Anemia Mother     Other (Other) Mother         Lymphoma    Colon Cancer Other         Close relative          PHYSICAL EXAM:     CONSTITUTIONAL: Overall appearance - Normal  HEENT: Normocephalic  EYES: Conjunctiva - Right: Normal, Left: Normal; EOMI  EARS: Inspection - Right: Normal, Left: Normal  NECK/THYROID: Inspection - Normal, Palpation - Normal, Thyroid gland - Normal, No adenopathy  RESPIRATORY: Inspection - Normal, Effort - Normal  CARDIOVASCULAR: Regular rhythm, No murmurs  ABDOMEN: Inspection - Normal, No abdominal tenderness  NEURO: Memory intact  PSYCH: Oriented to person, place, time, and situation, Appropriate mood and affect      Integument Physical Exam:       Left upper back, medial parascapular: 3 cm firm, freely movable lipomatous mass      ASSESSMENT/PLAN:     IMPRESSION:    Back mass        We had a long discussion.  I explained to the patient the nature of this lesion / these lesions, as well as treatment options.    EXCISION: This lesion should be excised.  This will result in a permanent scar, which will be longer than the size of the lesion.  Further excision may be necessary, if tumor is present microscopically on the pathology report.  This may result in a longer scar.  Recurrence may occur, which will require further surgery.         TREATMENT:    Questions were answered and the patient wishes to proceed with treatment.    Excision    RISKS:     Bleeding  Infection  Scar  Need for  further surgery  Anesthesia risks               8/19/2024  Brown Cabrera MD    [facility] Data Reviewed.               +++++++++++++++++++++++++++++++++++++++++++++++++    MEDICAL DECISION MAKING    PROBLEMS      MODERATE    (number / complexity)          Undiagnosed new problem with uncertain prognosis    DATA         STRAIGHTFORWARD    (amount / complexity)           MANAGEMENT RISK  HIGH    (complications/ morbidity)       Major surgery with risk factors                  [facility] LEVEL    MODERATE

## 2024-08-21 ENCOUNTER — LAB ENCOUNTER (OUTPATIENT)
Dept: LAB | Age: 54
End: 2024-08-21
Attending: FAMILY MEDICINE
Payer: COMMERCIAL

## 2024-08-21 DIAGNOSIS — M62.561 ATROPHY OF MUSCLE OF RIGHT LOWER LEG: ICD-10-CM

## 2024-08-21 LAB
ALT SERPL-CCNC: 27 U/L
ANION GAP SERPL CALC-SCNC: 4 MMOL/L (ref 0–18)
AST SERPL-CCNC: 25 U/L (ref ?–34)
BUN BLD-MCNC: 22 MG/DL (ref 9–23)
BUN/CREAT SERPL: 16.7 (ref 10–20)
CALCIUM BLD-MCNC: 9.6 MG/DL (ref 8.7–10.4)
CHLORIDE SERPL-SCNC: 108 MMOL/L (ref 98–112)
CHOLEST SERPL-MCNC: 136 MG/DL (ref ?–200)
CO2 SERPL-SCNC: 30 MMOL/L (ref 21–32)
COMPLEXED PSA SERPL-MCNC: 1.53 NG/ML (ref ?–4)
CREAT BLD-MCNC: 1.32 MG/DL
EGFRCR SERPLBLD CKD-EPI 2021: 63 ML/MIN/1.73M2 (ref 60–?)
FASTING PATIENT LIPID ANSWER: YES
FASTING STATUS PATIENT QL REPORTED: YES
GLUCOSE BLD-MCNC: 100 MG/DL (ref 70–99)
HDLC SERPL-MCNC: 40 MG/DL (ref 40–59)
LDLC SERPL CALC-MCNC: 82 MG/DL (ref ?–100)
NONHDLC SERPL-MCNC: 96 MG/DL (ref ?–130)
OSMOLALITY SERPL CALC.SUM OF ELEC: 297 MOSM/KG (ref 275–295)
POTASSIUM SERPL-SCNC: 4.6 MMOL/L (ref 3.5–5.1)
SODIUM SERPL-SCNC: 142 MMOL/L (ref 136–145)
TRIGL SERPL-MCNC: 71 MG/DL (ref 30–149)
TSI SER-ACNC: 0.9 MIU/ML (ref 0.55–4.78)
VLDLC SERPL CALC-MCNC: 11 MG/DL (ref 0–30)

## 2024-08-21 PROCEDURE — 80048 BASIC METABOLIC PNL TOTAL CA: CPT

## 2024-08-21 PROCEDURE — 80061 LIPID PANEL: CPT

## 2024-08-21 PROCEDURE — 84460 ALANINE AMINO (ALT) (SGPT): CPT

## 2024-08-21 PROCEDURE — 84450 TRANSFERASE (AST) (SGOT): CPT

## 2024-08-21 PROCEDURE — 36415 COLL VENOUS BLD VENIPUNCTURE: CPT

## 2024-08-21 PROCEDURE — 84443 ASSAY THYROID STIM HORMONE: CPT

## 2024-08-23 ENCOUNTER — LAB ENCOUNTER (OUTPATIENT)
Dept: LAB | Age: 54
End: 2024-08-23
Attending: FAMILY MEDICINE
Payer: COMMERCIAL

## 2024-08-23 ENCOUNTER — OFFICE VISIT (OUTPATIENT)
Dept: FAMILY MEDICINE CLINIC | Facility: CLINIC | Age: 54
End: 2024-08-23

## 2024-08-23 VITALS
BODY MASS INDEX: 29.47 KG/M2 | DIASTOLIC BLOOD PRESSURE: 78 MMHG | HEART RATE: 64 BPM | HEIGHT: 75 IN | SYSTOLIC BLOOD PRESSURE: 129 MMHG | WEIGHT: 237 LBS

## 2024-08-23 DIAGNOSIS — R79.89 ELEVATED SERUM CREATININE: Primary | ICD-10-CM

## 2024-08-23 DIAGNOSIS — R79.89 ELEVATED SERUM CREATININE: ICD-10-CM

## 2024-08-23 LAB
BILIRUB UR QL: NEGATIVE
CLARITY UR: CLEAR
GLUCOSE UR-MCNC: NORMAL MG/DL
HGB UR QL STRIP.AUTO: NEGATIVE
KETONES UR-MCNC: NEGATIVE MG/DL
LEUKOCYTE ESTERASE UR QL STRIP.AUTO: NEGATIVE
NITRITE UR QL STRIP.AUTO: NEGATIVE
PH UR: 5.5 [PH] (ref 5–8)
PROT UR-MCNC: NEGATIVE MG/DL
SP GR UR STRIP: >1.03 (ref 1–1.03)
UROBILINOGEN UR STRIP-ACNC: NORMAL

## 2024-08-23 PROCEDURE — 99213 OFFICE O/P EST LOW 20 MIN: CPT | Performed by: FAMILY MEDICINE

## 2024-08-23 PROCEDURE — 81003 URINALYSIS AUTO W/O SCOPE: CPT

## 2024-08-23 PROCEDURE — 3078F DIAST BP <80 MM HG: CPT | Performed by: FAMILY MEDICINE

## 2024-08-23 PROCEDURE — 3008F BODY MASS INDEX DOCD: CPT | Performed by: FAMILY MEDICINE

## 2024-08-23 PROCEDURE — 3074F SYST BP LT 130 MM HG: CPT | Performed by: FAMILY MEDICINE

## 2024-08-23 RX ORDER — PREDNISONE 10 MG/1
10 TABLET ORAL 3 TIMES DAILY
Qty: 15 TABLET | Refills: 0 | Status: SHIPPED | OUTPATIENT
Start: 2024-08-23

## 2024-08-23 NOTE — PROGRESS NOTES
Blood pressure 129/78, pulse 64, height 6' 3\" (1.905 m), weight 237 lb (107.5 kg).          Presents today following up for arthritis also insect bites.  Reports that he is getting a rash around the sites where he is getting bitten.  He denies any new soaps or detergents.  Denies any new close he has not washed before wearing.    No fevers.    Objective erythematous macular rash noted left arm to forearm area also on the left clavicular area    Assessment insect bite secondary reaction    No relief with hydrocortisone per patient    Plan prednisone prescription stop meloxicam creatinine slightly elevated last blood test    Follow-up blood test 1 to 2 weeks

## 2024-09-02 ENCOUNTER — PATIENT MESSAGE (OUTPATIENT)
Dept: FAMILY MEDICINE CLINIC | Facility: CLINIC | Age: 54
End: 2024-09-02

## 2024-09-04 NOTE — TELEPHONE ENCOUNTER
From: Josiah Baker  To: Rikki Galeana  Sent: 9/2/2024 3:26 PM CDT  Subject: Blood test    Do I need to fast before I redo my blood tests?

## 2024-09-09 ENCOUNTER — LAB ENCOUNTER (OUTPATIENT)
Dept: LAB | Age: 54
End: 2024-09-09
Attending: FAMILY MEDICINE
Payer: COMMERCIAL

## 2024-09-09 DIAGNOSIS — R79.89 ELEVATED SERUM CREATININE: ICD-10-CM

## 2024-09-09 LAB
ANION GAP SERPL CALC-SCNC: 6 MMOL/L (ref 0–18)
BUN BLD-MCNC: 19 MG/DL (ref 9–23)
BUN/CREAT SERPL: 15.2 (ref 10–20)
CALCIUM BLD-MCNC: 9.2 MG/DL (ref 8.7–10.4)
CHLORIDE SERPL-SCNC: 103 MMOL/L (ref 98–112)
CO2 SERPL-SCNC: 29 MMOL/L (ref 21–32)
CREAT BLD-MCNC: 1.25 MG/DL
EGFRCR SERPLBLD CKD-EPI 2021: 68 ML/MIN/1.73M2 (ref 60–?)
FASTING STATUS PATIENT QL REPORTED: NO
GLUCOSE BLD-MCNC: 84 MG/DL (ref 70–99)
OSMOLALITY SERPL CALC.SUM OF ELEC: 287 MOSM/KG (ref 275–295)
POTASSIUM SERPL-SCNC: 4.4 MMOL/L (ref 3.5–5.1)
SODIUM SERPL-SCNC: 138 MMOL/L (ref 136–145)

## 2024-09-09 PROCEDURE — 80048 BASIC METABOLIC PNL TOTAL CA: CPT

## 2024-09-09 PROCEDURE — 36415 COLL VENOUS BLD VENIPUNCTURE: CPT

## 2024-09-11 NOTE — DISCHARGE INSTRUCTIONS
HOME INSTRUCTIONS    Brown Cabrera M.D.   [phone]  Plastic and Reconstructive Surgery, Hand Surgery  [address], [address]  [place] [address]     GENERAL INSTRUCTIONS:  Do not remove dressing for any reason.  Some drainage (blood and fluid) through the dressing is expected.  Some swelling is normal.  Replace paper tapes only if they come off themselves.  You may wash the area today.  Take medications as directed.       YOU HAVE AN APPOINTMENT AT THE OFFICE ON TUESDAY 9/24/2024          [facility] [place]: POST-OP ANESTHESIA  The medication that you received for sedation or general anesthesia can last up to 24 hours. Your judgment and reflexes may be altered, even if you feel like your normal self.      We Recommend:   Do not drive any motor vehicle or bicycle   Avoid mowing the lawn, playing sports, or working with power tools/applicances (power saws, electric knives or mixers)   That you have someone stay with you on your first night home   Do not drink alcohol or take sleeping pills or tranquilizers   Do not sign legal documents within 24 hours of your procedure   If you had a nerve block for your surgery, take extra care not to put any pressure on your arm or hand for 24 hours    It is normal:  For you to have a sore throat if you had a breathing tube during surgery (while you were asleep!). The sore throat should get better within 48 hours. You can gargle with warm salt water (1/2 tsp in 4 oz warm water) or use a throat lozenge for comfort  To feel muscle aches or soreness especially in the abdomen, chest or neck. The achy feeling should go away in the next 24 hours  To feel weak, sleepy or \"wiped out\". Your should start feeling better in the next 24 hours.   To experience mild discomforts such as sore lip or tongue, headache, cramps, gas pains or a bloated feeling in your abdomen.   To experience mild back pain or soreness for a day or two if you had spinal or  epidural anesthesia.   If you had laparoscopic surgery, to feel shoulder pain or discomfort on the day of surgery.   For some patients to have nausea after surgery/anesthesia    If you feel nausea or experience vomiting:   Try to move around less.   Eat less than usual or drink only liquids until the next morning   Nausea should resolve in about 24 hours    If you have a problem when you are at home:    Call your surgeons office   Discharge Instructions: After Your Surgery  You’ve just had surgery. During surgery, you were given medicine called anesthesia to keep you relaxed and free of pain. After surgery, you may have some pain or nausea. This is common. Here are some tips for feeling better and getting well after surgery.   Going home  Your healthcare provider will show you how to take care of yourself when you go home. They'll also answer your questions. Have an adult family member or friend drive you home. For the first 24 hours after your surgery:   Don't drive or use heavy equipment.  Don't make important decisions or sign legal papers.  Take medicines as directed.  Don't drink alcohol.  Have someone stay with you, if needed. They can watch for problems and help keep you safe.  Be sure to go to all follow-up visits with your healthcare provider. And rest after your surgery for as long as your provider tells you to.   Coping with pain  If you have pain after surgery, pain medicine will help you feel better. Take it as directed, before pain becomes severe. Also, ask your healthcare provider or pharmacist about other ways to control pain. This might be with heat, ice, or relaxation. And follow any other instructions your surgeon or nurse gives you.      Stay on schedule with your medicine.     Tips for taking pain medicine  To get the best relief possible, remember these points:   Pain medicines can upset your stomach. Taking them with a little food may help.  Most pain relievers taken by mouth need at least 20  to 30 minutes to start to work.  Don't wait till your pain becomes severe before you take your medicine. Try to time your medicine so that you can take it before starting an activity. This might be before you get dressed, go for a walk, or sit down for dinner.  Constipation is a common side effect of some pain medicines. Call your healthcare provider before taking any medicines such as laxatives or stool softeners to help ease constipation. Also ask if you should skip any foods. Drinking lots of fluids and eating foods such as fruits and vegetables that are high in fiber can also help. Remember, don't take laxatives unless your surgeon has prescribed them.  Drinking alcohol and taking pain medicine can cause dizziness and slow your breathing. It can even be deadly. Don't drink alcohol while taking pain medicine.  Pain medicine can make you react more slowly to things. Don't drive or run machinery while taking pain medicine.  Your healthcare provider may tell you to take acetaminophen to help ease your pain. Ask them how much you're supposed to take each day. Acetaminophen or other pain relievers may interact with your prescription medicines or other over-the-counter (OTC) medicines. Some prescription medicines have acetaminophen and other ingredients in them. Using both prescription and OTC acetaminophen for pain can cause you to accidentally overdose. Read the labels on your OTC medicines with care. This will help you to clearly know the list of ingredients, how much to take, and any warnings. It may also help you not take too much acetaminophen. If you have questions or don't understand the information, ask your pharmacist or healthcare provider to explain it to you before you take the OTC medicine.   Managing nausea  Some people have an upset stomach (nausea) after surgery. This is often because of anesthesia, pain, or pain medicine, less movement of food in the stomach, or the stress of surgery. These tips will  help you handle nausea and eat healthy foods as you get better. If you were on a special food plan before surgery, ask your healthcare provider if you should follow it while you get better. Check with your provider on how your eating should progress. It may depend on the surgery you had. These general tips may help:   Don't push yourself to eat. Your body will tell you when to eat and how much.  Start off with clear liquids and soup. They're easier to digest.  Next try semi-solid foods as you feel ready. These include mashed potatoes, applesauce, and gelatin.  Slowly move to solid foods. Don’t eat fatty, rich, or spicy foods at first.  Don't force yourself to have 3 large meals a day. Instead eat smaller amounts more often.  Take pain medicines with a small amount of solid food, such as crackers or toast. This helps prevent nausea.  When to call your healthcare provider  Call your healthcare provider right away if you have any of these:   You still have too much pain, or the pain gets worse, after taking the medicine. The medicine may not be strong enough. Or there may be a complication from the surgery.  You feel too sleepy, dizzy, or groggy. The medicine may be too strong.  Side effects such as nausea or vomiting. Your healthcare provider may advise taking other medicines to .  Skin changes such as rash, itching, or hives. This may mean you have an allergic reaction. Your provider may advise taking other medicines.  The incision looks different (for instance, part of it opens up).  Bleeding or fluid leaking from the incision site, and weren't told to expect that.  Fever of 100.4°F (38°C) or higher, or as directed by your provider.  Call 911  Call 911 right away if you have:   Trouble breathing  Facial swelling    If you have obstructive sleep apnea   You were given anesthesia medicine during surgery to keep you comfortable and free of pain. After surgery, you may have more apnea spells because of this medicine and  other medicines you were given. The spells may last longer than normal.    At home:  Keep using the continuous positive airway pressure (CPAP) device when you sleep. Unless your healthcare provider tells you not to, use it when you sleep, day or night. CPAP is a common device used to treat obstructive sleep apnea.  Talk with your provider before taking any pain medicine, muscle relaxants, or sedatives. Your provider will tell you about the possible dangers of taking these medicines.  Contact your provider if your sleeping changes a lot even when taking medicines as directed.  [employer] last reviewed this educational content on 10/1/2021  © 7604-3271 [employer]. All rights reserved. This information is not intended as a substitute for professional medical care. Always follow your healthcare professional's instructions.

## 2024-09-12 ENCOUNTER — OFFICE VISIT (OUTPATIENT)
Dept: FAMILY MEDICINE CLINIC | Facility: CLINIC | Age: 54
End: 2024-09-12
Payer: COMMERCIAL

## 2024-09-12 VITALS
BODY MASS INDEX: 27.64 KG/M2 | HEART RATE: 67 BPM | DIASTOLIC BLOOD PRESSURE: 70 MMHG | WEIGHT: 227 LBS | HEIGHT: 76 IN | SYSTOLIC BLOOD PRESSURE: 130 MMHG

## 2024-09-12 DIAGNOSIS — M16.0 BILATERAL HIP JOINT ARTHRITIS: ICD-10-CM

## 2024-09-12 DIAGNOSIS — M48.061 NEUROFORAMINAL STENOSIS OF LUMBAR SPINE: ICD-10-CM

## 2024-09-12 DIAGNOSIS — Z00.00 ROUTINE PHYSICAL EXAMINATION: Primary | ICD-10-CM

## 2024-09-12 DIAGNOSIS — I10 HYPERTENSION, UNSPECIFIED TYPE: ICD-10-CM

## 2024-09-12 RX ORDER — MELOXICAM 15 MG/1
TABLET ORAL
COMMUNITY
Start: 2024-09-09 | End: 2024-09-12

## 2024-09-12 NOTE — PROGRESS NOTES
REASON FOR VISIT:    Josiah Baker is a 54 year old male who presents for an Annual Health Assessment.        Patient Active Problem List   Diagnosis    Cellulitis and abscess of hand, except fingers and thumb    Laboratory examination ordered as part of a routine general medical examination    Psychosexual dysfunction with inhibited sexual excitement    Unspecified sinusitis (chronic)    Chronic sinusitis    Erectile dysfunction    Antrochoanal polyp    Open wound of finger(s) , complicated    Elbow injury    Left foot pain    Laceration of forearm, left    Forearm laceration    Viral warts    Tinea corporis    Atrophy of muscle of right lower leg    Numbness and tingling     General Health     At any time do you feel concerned for the safety/well-being of yourself and/or your children, in your home or elsewhere?: No     CAGE:           Depression Screening (PHQ-2/PHQ-9):  Over the LAST 2 WEEKS             PREVENTATIVE SERVICES  INDICATIONS AND SCHEDULE Recommendation Internal Lab or Procedure   Colonoscopy Screen Every 10 years Health Maintenance   Topic Date Due    Colorectal Cancer Screening  10/02/2029      Flex Sigmoidoscopy Screen  Every 5 years No results found for this or any previous visit.   Fecal Occult Blood  Annually No results found for: \"FOB\", \"OCCULTSTOOL\"   Obesity Screening Screen all adults annually Body mass index is 27.63 kg/m².     Preventive Services for Which Recommendations Vary with Risk Recommendation Internal Lab or Procedure   Cholesterol Screening Recommended screening varies with age, risk and gender LDL Cholesterol (mg/dL)   Date Value   08/21/2024 82     LDL CHOLESTROL (mg/dL)   Date Value   04/10/2012 63      Diabetes Screening  If history of high blood pressure or other  risk factors No results found for: \"A1C\"  Glucose (mg/dL)   Date Value   09/09/2024 84     GLUCOSE (mg/dL)   Date Value   01/10/2013 100 (H)        Gonorrhea Screening If high risk No results found for:  \"GONOCOCCUS\"   HIV Screening For all adults age 18-65, older adults at increased risk No results found for: \"HIV\"   Syphilis Screening Screen if pregnant or high risk Rapid Plasma Reagin (no units)   Date Value   07/01/2024 Nonreactive      Hepatitis C Screening Screen pts at high risk plus screen one time for adults born 1945 - 1965 Hepatitis C Virus Antibody (no units)   Date Value   12/01/2017 Nonreactive      Tuberculosis Screen If high risk No components found for: \"PPDINDURAT\"       SPECIFIC DISEASE MONITORING Internal Lab or Procedure   No disease specific diagnoses    ALLERGIES:   No Known Allergies  CURRENT MEDICATIONS:   No current outpatient medications on file.      MEDICAL INFORMATION:   Past Medical History:    Antrochoanal polyp    Right    Arthritis    both hips    Cellulitis and abscess of hand, except fingers and thumb    Cellulitis and abscess of hand, except fingers and thumb    Chronic sinusitis    Erectile dysfunction    Finger injury    metal foreign object in soft tissues of volar aspect of 5th middle phalanx.    History of laminectomy    ?    Knee injury    R ACL/MCL  s/p surgical repair    Laboratory examination ordered as part of a routine general medical examination    Lipid screening    Lipid screening    Per [employer]    PONV (postoperative nausea and vomiting)    Psychosexual dysfunction with inhibited sexual excitement    Screening PSA (prostate specific antigen)    Per [facility]    Visual impairment    glasses      Past Surgical History:   Procedure Laterality Date    Arthroscopy of joint unlisted Left     knee plica removed    Colonoscopy N/A 10/02/2019    Procedure: COLONOSCOPY;  Surgeon: Florentino Saavedra MD;  Location: [facility]    Other surgical history      Nasal polyp removal    Other surgical history      RIGHT ACL/MCL REPAIR      Family History   Problem Relation Age of Onset    Hypertension Father     Other (Other) Father         DEMENTIA    Seizure Disorder Mother      Anemia Mother     Other (Other) Mother         Lymphoma    Colon Cancer Other         Close relative     NO FAMILY HISTORY OF PROSTATE OR COLON CANCER     SOCIAL HISTORY:   Social History     Socioeconomic History    Marital status: Single   Occupational History    Occupation: self employed   Tobacco Use    Smoking status: Never    Smokeless tobacco: Never   Vaping Use    Vaping status: Never Used   Substance and Sexual Activity    Alcohol use: Yes     Alcohol/week: 10.0 standard drinks of alcohol     Types: 10 Standard drinks or equivalent per week     Comment: occasional    Drug use: No   Other Topics Concern    Caffeine Concern Yes     Comment: 1-2 cups daily, (coffee, soda)    Exercise Yes     Comment: 3-5 x's a week    Reaction to local anesthetic No   Social History Narrative    Going to [facility] on business.              Social Determinants of Health     Financial Resource Strain: Not on File (2023)    Received from [facility]    Financial Resource Strain     Financial Resource Strain: 0   Transportation Needs: Not on File (2023)    Received from [facility]    Transportation Needs     Transportation: 0   Physical Activity: Not on File (2023)    Received from [facility]    Physical Activity     Physical Activity: 0   Stress: Not on File (2023)    Received from [facility]    Stress     Stress: 0   Social Connections: Not on File (2023)    Received from [facility]    Social Connections     Social Connections and Isolation: 0   Housing Stability: Not on File (2023)    Received from [facility]    Housing Stability     Housin     Occ:  :       REVIEW OF SYSTEMS:   GENERAL: feels well otherwise  SKIN: denies any unusual skin lesions  EYES: denies blurred vision or double vision  HEENT: denies nasal congestion, sinus pain or ST  LUNGS: denies shortness of breath with exertion  CARDIOVASCULAR: denies chest pain on exertion  GI: denies abdominal pain, denies  heartburn  : denies nocturia or changes in stream  MUSCULOSKELETAL: denies back pain  NEURO: denies headaches  PSYCHE: denies depression or anxiety  HEMATOLOGIC: denies hx of anemia  ENDOCRINE: denies thyroid history  ALL/ASTHMA: denies hx of allergy or asthma  NO BLOOD IN STOOL  EXAM:   /70   Pulse 67   Ht 6' 4\" (1.93 m)   Wt 227 lb (103 kg)   BMI 27.63 kg/m²   >   BP Readings from Last 3 Encounters:   09/12/24 130/70   08/23/24 129/78   07/30/24 125/75        GENERAL: well developed, well nourished, in no apparent distress   SKIN: no rashes, no suspicious lesions  HEENT: atraumatic, normocephalic, ears and throat are clear  EYES:PERRLA, EOMI, conjunctiva are clear.    NECK: supple, no adenopathy, no bruits  CHEST: no chest tenderness  LUNGS: clear to auscultation  CARDIO: RRR without murmur  GI: good BS's, no masses, HSM or tenderness  : two descended testes, no masses, no hernia, no penile lesions  RECTAL: deferred  MUSCULOSKELETAL: back is not tender, FROM of the back  EXTREMITIES: no cyanosis, clubbing or edema  NEURO: Oriented times three, cranial nerves are intact, motor and sensory are grossly intact         ASSESSMENT AND OTHER RELEVANT CHRONIC CONDITIONS:   Josiah Baker is a 54 year old male who presents for an Annual Health Assessment.     PLAN SUMMARY:   Diagnoses and all orders for this visit:    Routine physical examination    Hypertension, unspecified type    Neuroforaminal stenosis of lumbar spine    Bilateral hip joint arthritis  -     Basic Metabolic Panel (8); Future       The patient indicates understanding of these issues and agrees to the plan.  No follow-ups on file.  Diet counseling perfomed    SUGGESTED VACCINATIONS - Influenza, Pneumococcal, Zoster, Tetanus, HPV   Influenza: No recommendations at this time  Pneumonia: No recommendations at this time  HPV: No recommendations at this time  Tdap: No recommendations at this time  Shingles:      Influenza Annually    Pneumococcal if high risk   Td/Tdap once then every 10 years   HPV Males 11-21   Zoster (Shingles) 60 and older: one dose   Varicella 2 doses if not immune   MMR 1-2 doses if born after 1958 and not immune     1. Routine physical examination  No vaccines at this time    2. Hypertension, unspecified type  Controlled off medication    3. Neuroforaminal stenosis of lumbar spine  No complaints at this time    4. Bilateral hip joint arthritis  Will have lipoma removed from back tomorrow considering hip replacement into next year  - Basic Metabolic Panel (8); Future

## 2024-09-13 ENCOUNTER — HOSPITAL ENCOUNTER (OUTPATIENT)
Facility: HOSPITAL | Age: 54
Setting detail: HOSPITAL OUTPATIENT SURGERY
Discharge: HOME | End: 2024-09-13
Attending: PLASTIC SURGERY | Admitting: PLASTIC SURGERY
Payer: COMMERCIAL

## 2024-09-13 ENCOUNTER — ANESTHESIA EVENT (OUTPATIENT)
Dept: SURGERY | Facility: HOSPITAL | Age: 54
End: 2024-09-13
Payer: COMMERCIAL

## 2024-09-13 ENCOUNTER — ANESTHESIA (OUTPATIENT)
Dept: SURGERY | Facility: HOSPITAL | Age: 54
End: 2024-09-13
Payer: COMMERCIAL

## 2024-09-13 VITALS
OXYGEN SATURATION: 100 % | SYSTOLIC BLOOD PRESSURE: 140 MMHG | WEIGHT: 222.88 LBS | TEMPERATURE: 98 F | HEIGHT: 76 IN | HEART RATE: 61 BPM | DIASTOLIC BLOOD PRESSURE: 93 MMHG | BODY MASS INDEX: 27.14 KG/M2 | RESPIRATION RATE: 18 BRPM

## 2024-09-13 DIAGNOSIS — D21.3 BENIGN NEOPLASM OF CONNECTIVE AND SOFT TISSUE OF THORAX: ICD-10-CM

## 2024-09-13 PROBLEM — Z04.9 OBSERVATION FOR SUSPECTED CONDITION: Status: ACTIVE | Noted: 2024-09-13

## 2024-09-13 PROCEDURE — 21932 EXC BACK TUM DEEP < 5 CM: CPT | Performed by: PLASTIC SURGERY

## 2024-09-13 PROCEDURE — 3008F BODY MASS INDEX DOCD: CPT | Performed by: PLASTIC SURGERY

## 2024-09-13 PROCEDURE — 3080F DIAST BP >= 90 MM HG: CPT | Performed by: PLASTIC SURGERY

## 2024-09-13 PROCEDURE — 3077F SYST BP >= 140 MM HG: CPT | Performed by: PLASTIC SURGERY

## 2024-09-13 PROCEDURE — 0JB70ZZ EXCISION OF BACK SUBCUTANEOUS TISSUE AND FASCIA, OPEN APPROACH: ICD-10-PCS | Performed by: PLASTIC SURGERY

## 2024-09-13 RX ORDER — HYDROMORPHONE HYDROCHLORIDE 1 MG/ML
0.4 INJECTION, SOLUTION INTRAMUSCULAR; INTRAVENOUS; SUBCUTANEOUS EVERY 5 MIN PRN
Status: DISCONTINUED | OUTPATIENT
Start: 2024-09-13 | End: 2024-09-13

## 2024-09-13 RX ORDER — MORPHINE SULFATE 10 MG/ML
6 INJECTION, SOLUTION INTRAMUSCULAR; INTRAVENOUS EVERY 10 MIN PRN
Status: DISCONTINUED | OUTPATIENT
Start: 2024-09-13 | End: 2024-09-13

## 2024-09-13 RX ORDER — MORPHINE SULFATE 4 MG/ML
4 INJECTION, SOLUTION INTRAMUSCULAR; INTRAVENOUS EVERY 10 MIN PRN
Status: DISCONTINUED | OUTPATIENT
Start: 2024-09-13 | End: 2024-09-13

## 2024-09-13 RX ORDER — HYDROMORPHONE HYDROCHLORIDE 1 MG/ML
0.6 INJECTION, SOLUTION INTRAMUSCULAR; INTRAVENOUS; SUBCUTANEOUS EVERY 5 MIN PRN
Status: DISCONTINUED | OUTPATIENT
Start: 2024-09-13 | End: 2024-09-13

## 2024-09-13 RX ORDER — HYDROCODONE BITARTRATE AND ACETAMINOPHEN 7.5; 325 MG/1; MG/1
1 TABLET ORAL EVERY 4 HOURS PRN
Status: DISCONTINUED | OUTPATIENT
Start: 2024-09-13 | End: 2024-09-13

## 2024-09-13 RX ORDER — SODIUM CHLORIDE, SODIUM LACTATE, POTASSIUM CHLORIDE, CALCIUM CHLORIDE 600; 310; 30; 20 MG/100ML; MG/100ML; MG/100ML; MG/100ML
INJECTION, SOLUTION INTRAVENOUS CONTINUOUS
Status: DISCONTINUED | OUTPATIENT
Start: 2024-09-13 | End: 2024-09-13

## 2024-09-13 RX ORDER — MORPHINE SULFATE 4 MG/ML
2 INJECTION, SOLUTION INTRAMUSCULAR; INTRAVENOUS EVERY 10 MIN PRN
Status: DISCONTINUED | OUTPATIENT
Start: 2024-09-13 | End: 2024-09-13

## 2024-09-13 RX ORDER — HYDROMORPHONE HYDROCHLORIDE 1 MG/ML
0.2 INJECTION, SOLUTION INTRAMUSCULAR; INTRAVENOUS; SUBCUTANEOUS EVERY 5 MIN PRN
Status: DISCONTINUED | OUTPATIENT
Start: 2024-09-13 | End: 2024-09-13

## 2024-09-13 RX ORDER — LIDOCAINE HYDROCHLORIDE AND EPINEPHRINE 5; 5 MG/ML; UG/ML
INJECTION, SOLUTION INFILTRATION; PERINEURAL AS NEEDED
Status: DISCONTINUED | OUTPATIENT
Start: 2024-09-13 | End: 2024-09-13 | Stop reason: HOSPADM

## 2024-09-13 RX ORDER — NALOXONE HYDROCHLORIDE 0.4 MG/ML
0.08 INJECTION, SOLUTION INTRAMUSCULAR; INTRAVENOUS; SUBCUTANEOUS AS NEEDED
Status: DISCONTINUED | OUTPATIENT
Start: 2024-09-13 | End: 2024-09-13

## 2024-09-13 RX ORDER — ACETAMINOPHEN 500 MG
1000 TABLET ORAL ONCE
Status: COMPLETED | OUTPATIENT
Start: 2024-09-13 | End: 2024-09-13

## 2024-09-13 RX ORDER — KETOROLAC TROMETHAMINE 30 MG/ML
INJECTION, SOLUTION INTRAMUSCULAR; INTRAVENOUS AS NEEDED
Status: DISCONTINUED | OUTPATIENT
Start: 2024-09-13 | End: 2024-09-13 | Stop reason: SURG

## 2024-09-13 RX ORDER — MIDAZOLAM HYDROCHLORIDE 1 MG/ML
INJECTION INTRAMUSCULAR; INTRAVENOUS AS NEEDED
Status: DISCONTINUED | OUTPATIENT
Start: 2024-09-13 | End: 2024-09-13 | Stop reason: SURG

## 2024-09-13 RX ORDER — ROCURONIUM BROMIDE 10 MG/ML
INJECTION, SOLUTION INTRAVENOUS AS NEEDED
Status: DISCONTINUED | OUTPATIENT
Start: 2024-09-13 | End: 2024-09-13 | Stop reason: SURG

## 2024-09-13 RX ADMIN — ROCURONIUM BROMIDE 40 MG: 10 INJECTION, SOLUTION INTRAVENOUS at 09:07:00

## 2024-09-13 RX ADMIN — KETOROLAC TROMETHAMINE 30 MG: 30 INJECTION, SOLUTION INTRAMUSCULAR; INTRAVENOUS at 10:15:00

## 2024-09-13 RX ADMIN — MIDAZOLAM HYDROCHLORIDE 2 MG: 1 INJECTION INTRAMUSCULAR; INTRAVENOUS at 09:04:00

## 2024-09-13 NOTE — BRIEF OP NOTE
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

## 2024-09-13 NOTE — ANESTHESIA PROCEDURE NOTES
Airway  Date/Time: 9/13/2024 9:09 AM  Urgency: elective    Airway not difficult    General Information and Staff    Patient location during procedure: OR  Anesthesiologist: Lazaro Leung MD, PhD  Performed: anesthesiologist   Performed by: Lazaro Leung MD, PhD  Authorized by: Lazaro Leung MD, PhD      Indications and Patient Condition  Indications for airway management: anesthesia  Sedation level: deep  Preoxygenated: yes  Patient position: sniffing  Mask difficulty assessment: 1 - vent by mask    Final Airway Details  Final airway type: endotracheal airway      Successful airway: ETT  Cuffed: yes   Successful intubation technique: direct laryngoscopy  Facilitating devices/methods: anterior pressure/BURP  Endotracheal tube insertion site: oral  Blade size: #4  ETT size (mm): 7.0    Cormack-Lehane Classification: grade IIA - partial view of glottis  Placement verified by: capnometry   Measured from: lips  ETT to lips (cm): 2  Number of attempts at approach: 1

## 2024-09-13 NOTE — ANESTHESIA POSTPROCEDURE EVALUATION
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    Vitals Value Taken Time   /80 09/13/24 1016   Temp 98.0 09/13/24 1023   Pulse 66 09/13/24 1022   Resp 17 09/13/24 1022   SpO2 98 % 09/13/24 1022   Vitals shown include unfiled device data.    [facility] AN Post Evaluation:   Patient Evaluated in PACU  Patient Participation: complete - patient participated  Level of Consciousness: awake and alert  Pain Score: 2  Pain Management: adequate  Airway Patency:patent  Yes    Nausea/Vomiting: none  Cardiovascular Status: acceptable  Respiratory Status: acceptable  Postoperative Hydration acceptable      Lazaro Leung MD, PhD  9/13/2024 10:23 AM

## 2024-09-13 NOTE — INTERVAL H&P NOTE
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  I discussed with the patient and/or legal representative the potential benefits, risks and side effects of this procedure; the likelihood of the patient achieving goals; and potential problems that might occur during recuperation.  I discussed reasonable alternatives to the procedure, including risks, benefits and side effects related to the alternatives and risks related to not receiving this procedure.  We will proceed with procedure as planned.

## 2024-09-13 NOTE — ANESTHESIA PREPROCEDURE EVALUATION
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]    Relevant Problems   No relevant active problems       NPO:  Last Liquid Consumption Date: 09/12/24  Last Liquid Consumption Time: 2000  Last Solid Consumption Date: 09/12/24  Last Solid Consumption Time: 2000  Last Liquid Consumption Date: 09/12/24          History Review:  Patient Active Problem List    Diagnosis Date Noted    Atrophy of muscle of right lower leg 01/29/2022    Numbness and tingling 01/29/2022    Viral warts 04/05/2017    Tinea corporis 04/05/2017    Forearm laceration 02/03/2017    Laceration of forearm, left 01/31/2017    Elbow injury 02/12/2016    Left foot pain 02/12/2016    Open wound of finger(s) , complicated 06/20/2013    Cellulitis and abscess of hand, except fingers and thumb 06/29/2012    Laboratory examination ordered as part of a routine general medical examination 06/29/2012    Psychosexual dysfunction with inhibited sexual excitement 06/29/2012    Unspecified sinusitis (chronic) 06/29/2012    Chronic sinusitis 06/25/2012    Erectile dysfunction 06/25/2012    Antrochoanal polyp 01/18/2011       Past Medical History:    Antrochoanal polyp    Right    Arthritis    both hips    Cellulitis and abscess of hand, except fingers and thumb    Cellulitis and abscess of hand, except fingers and thumb    Chronic sinusitis    Erectile dysfunction    Finger injury    metal foreign object in soft tissues of volar aspect of 5th middle phalanx.    History of laminectomy    ?    Knee injury    R ACL/MCL  s/p surgical repair    Laboratory examination ordered as part of a routine general medical examination    Lipid screening    Lipid screening    Per [facility]    PONV (postoperative nausea and vomiting)     Psychosexual dysfunction with inhibited sexual excitement    Screening PSA (prostate specific antigen)    Per [facility]    Visual impairment    glasses       Past Surgical History:   Procedure Laterality Date    Arthroscopy of joint unlisted Left     knee plica removed    Colonoscopy N/A 10/02/2019    Procedure: COLONOSCOPY;  Surgeon: Florentino Saavedra MD;  Location: [facility]    Other surgical history      Nasal polyp removal    Other surgical history      RIGHT ACL/MCL REPAIR       No medications prior to admission.     Current Facility-Administered Medications Ordered in Epic   Medication Dose Route Frequency Provider Last Rate Last Admin    lactated ringers infusion   Intravenous Continuous Brown Yo MD         No current Psychiatric-ordered outpatient medications on file.       No Known Allergies    Family History   Problem Relation Age of Onset    Hypertension Father     Other (Other) Father         DEMENTIA    Seizure Disorder Mother     Anemia Mother     Other (Other) Mother         Lymphoma    Colon Cancer Other         Close relative     Social History     Socioeconomic History    Marital status: Single   Occupational History    Occupation: self employed   Tobacco Use    Smoking status: Never    Smokeless tobacco: Never   Vaping Use    Vaping status: Never Used   Substance and Sexual Activity    Alcohol use: Yes     Alcohol/week: 10.0 standard drinks of alcohol     Types: 10 Standard drinks or equivalent per week     Comment: occasional    Drug use: No   Other Topics Concern    Caffeine Concern Yes     Comment: 1-2 cups daily, (coffee, soda)    Exercise Yes     Comment: 3-5 x's a week    Reaction to local anesthetic No       Available pre-op labs reviewed.     Lab Results   Component Value Date     09/09/2024    K 4.4 09/09/2024     09/09/2024    CO2 29.0 09/09/2024    BUN 19 09/09/2024    CREATSERUM 1.25 09/09/2024    GLU 84 09/09/2024    CA 9.2 09/09/2024          Vital  Signs:  Body mass index is 27.13 kg/m².   height is 1.93 m (6' 4\") and weight is 101.1 kg (222 lb 14.4 oz). His oral temperature is 98.7 °F (37.1 °C). His blood pressure is 140/84 and his pulse is 67. His respiration is 14 and oxygen saturation is 97%.   Vitals:    09/05/24 0843 09/13/24 0726   BP:  140/84   Pulse:  67   Resp:  14   Temp:  98.7 °F (37.1 °C)   TempSrc:  Oral   SpO2:  97%   Weight: 99.8 kg (220 lb) 101.1 kg (222 lb 14.4 oz)   Height: 1.93 m (6' 4\") 1.93 m (6' 4\")        Anesthesia Evaluation     Patient summary reviewed    History of anesthetic complications   Airway   Mallampati: II  Dental - Dentition appears grossly intact     Pulmonary - normal exam   Cardiovascular - normal exam    Neuro/Psych    (+)  neuromuscular disease,        GI/Hepatic/Renal      Endo/Other    Abdominal  - normal exam                 Anesthesia Plan:   ASA:  3  Plan:   General  Airway:  ETT  Informed Consent Plan and Risks Discussed With:  Patient      I have informed Josiah SUMAN Presecky and/or legal guardian or family member of the nature of the anesthetic plan, benefits, risks including possible dental damage if relevant, major complications, and any alternative forms of anesthetic management.   All of the patient's questions were answered to the best of my ability. The patient desires the anesthetic management as planned.  Lazaro Leung MD, PhD  9/13/2024 8:36 AM  Present on Admission:  **None**

## 2024-09-13 NOTE — OPERATIVE REPORT
[facility]    PATIENT'S NAME: DWAYNE GUTIERREZ   ATTENDING PHYSICIAN: Brown Cabrera MD   OPERATING PHYSICIAN: Brown Cabrera MD   PATIENT ACCOUNT#:   [id number]    LOCATION:  [place] [address]  MEDICAL RECORD #:   [id number]       YOB: 1970  ADMISSION DATE:       09/13/2024      OPERATION DATE:  09/13/2024    OPERATIVE REPORT      PREOPERATIVE DIAGNOSIS:  Left back mass.  POSTOPERATIVE DIAGNOSIS:  Left back mass, pending pathology report.  PROCEDURE:  Excision of subfascial mass, left back.    INDICATIONS:  A 54-year-old male, right-hand dominant, with a 20-year history of a mass of the left upper back.  It has increased in size and is occasionally painful.  He is admitted to the operating amphitheater for excision.    FINDINGS:  A 5 x 3.5 x 1.5 firm lipomatous mass is present, which is well encapsulated.    OPERATIVE TECHNIQUE:  Patient was placed under general anesthesia, rotated to the prone position and carefully padded.  Operative site was infiltrated with 0.5% lidocaine with 1:200,000 epinephrine.      An incision was made over the mass.  We incised the fascia to gain access to the mass, which was dissected from surrounding structures and peeled off the underlying muscle.  Meticulous hemostasis was achieved.      Fascia was closed with 2-0 Vicryl, deep dermal sutures of 2-0 Vicryl were placed.  Skin edges were reapproximated with 4-0 nylon, and a Steri-Strip was applied.    The patient tolerated the procedure well and left the operating suite in satisfactory condition.    Dictated By Brown Cabrera MD  d: 09/13/2024 10:09:08  t: 09/13/2024 16:23:39  Job [id number]  J/    cc: MD Rikki Moore DO Daniel E. Michalik, MD

## 2024-09-14 ENCOUNTER — TELEPHONE (OUTPATIENT)
Dept: SURGERY | Facility: CLINIC | Age: 54
End: 2024-09-14

## 2024-09-14 NOTE — TELEPHONE ENCOUNTER
Spoke w/the patient and he denies pain or c/o discomfort at this time.  Patient reminded that he has a steri strip to incisional site and he may shower.   Patient reminded of RN appt for suture removal on 9/24.  Patient verbalized an understanding.  Patient instructed to call the office w/any other questions and/or concerns.  Dr. Cabrera notified.

## 2024-09-16 ENCOUNTER — TELEPHONE (OUTPATIENT)
Dept: SURGERY | Facility: CLINIC | Age: 54
End: 2024-09-16

## 2024-09-16 NOTE — TELEPHONE ENCOUNTER
Spoke w/ patient.  Patient told per Dr. Cabrera pathology results from surgery,  a lipoma, a benign fatty tumor.   Patient Verbalized understanding.  Patient Instructed to call the office with any questions and/or concerns.  Dr. Cabrera notified.

## 2024-09-24 ENCOUNTER — NURSE ONLY (OUTPATIENT)
Dept: SURGERY | Facility: CLINIC | Age: 54
End: 2024-09-24
Payer: COMMERCIAL

## 2024-09-24 DIAGNOSIS — D21.3 BENIGN NEOPLASM OF CONNECTIVE AND SOFT TISSUE OF THORAX: ICD-10-CM

## 2024-09-24 DIAGNOSIS — Z48.02 VISIT FOR SUTURE REMOVAL: Primary | ICD-10-CM

## 2024-09-24 RX ORDER — EMTRICITABINE AND TENOFOVIR DISOPROXIL FUMARATE 200; 300 MG/1; MG/1
1 TABLET, FILM COATED ORAL DAILY
COMMUNITY
Start: 2024-09-19

## 2024-09-24 RX ORDER — MELOXICAM 10 MG/1
10 CAPSULE ORAL DAILY
COMMUNITY

## 2024-09-24 NOTE — PROGRESS NOTES
Surgery 1: Back mass  - Date: 24  - Days Since: 11   Pt is in the office today for a Nurse visit for suture removal of back.  Pt identified by name and .  Orders reviewed.  Denies pain.  No steri-strip present.  Incision with running stitch CDI,edges well approximated without s/s of infection.  Sutures removed without difficulty.  Pt tolerated well.  Incision not completely epithelialized.  Washed with soap and water then dried.  Dr Cabrera sent unidentifiable photo.  Per Dr Cabrera new steri-strip applied.  Pt instructed to remove steri-strip 10/1/24 and then start washing daily with soap and water and start Eucerin massage.  Per telephone  order from Dr Cabrera, pt  given verbal instructions with demonstration of Eucerin massage..  Given \"After Skin Surgery\" Pamphlet.  Discussed sun exposure, sun block usage and scars sensitivity to the sun.  Questions answered.  Discharged.  Verbalized understanding.  Instructed to call the office with s/s of infection, any further questions and/ or concerns.  Dr Cabrera notified.    Sutures removed.  Scar healing well without s/s of infection.  Steri-strip till 10/1/24 then Eucerin.  Discharged

## 2024-10-14 ENCOUNTER — OFFICE VISIT (OUTPATIENT)
Dept: ORTHOPEDICS CLINIC | Facility: CLINIC | Age: 54
End: 2024-10-14
Payer: COMMERCIAL

## 2024-10-14 ENCOUNTER — TELEPHONE (OUTPATIENT)
Dept: ORTHOPEDICS CLINIC | Facility: CLINIC | Age: 54
End: 2024-10-14

## 2024-10-14 ENCOUNTER — HOSPITAL ENCOUNTER (OUTPATIENT)
Dept: GENERAL RADIOLOGY | Facility: HOSPITAL | Age: 54
Discharge: HOME | End: 2024-10-14
Attending: ORTHOPAEDIC SURGERY
Payer: COMMERCIAL

## 2024-10-14 DIAGNOSIS — M16.12 PRIMARY OSTEOARTHRITIS OF LEFT HIP: Primary | ICD-10-CM

## 2024-10-14 DIAGNOSIS — M16.12 PRIMARY OSTEOARTHRITIS OF LEFT HIP: ICD-10-CM

## 2024-10-14 PROCEDURE — 73502 X-RAY EXAM HIP UNI 2-3 VIEWS: CPT | Performed by: ORTHOPAEDIC SURGERY

## 2024-10-14 NOTE — TELEPHONE ENCOUNTER
Patient was given pre surgical papers today in clinic. Including Dental clearance form, [facility] Surgery Check List (Medical clearance), Pre Operative Education for Total Joint replacements and [employer] booklet.    Spoke with patient to offer surgery dates. He chose 11/19.

## 2024-10-14 NOTE — PROGRESS NOTES
NURSING INTAKE COMMENTS:   Chief Complaint   Patient presents with    Hip Pain     L hip f/u - Pt had hip injection  with fluoro on 04/12 with 1 month of relief. Would like to discuss hip sx.        HPI: This 54 year old male presents today with complaints of Left hip pain.  He finds decreasing standing tolerance so that when he is standing he is always looking for a place to sit, and walking is difficult as well.  He has had 2 cortisone injections with partial temporary relief, and has become dependent on meloxicam, though he had a blood test which showed an increase in creatinine and had to stop it for a while.     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    History of laminectomy    ?    Knee injury    R ACL/MCL  s/p surgical repair    Laboratory examination ordered as part of a routine general medical examination    Lipid screening    Lipid screening    Per [facility]    PONV (postoperative nausea and vomiting)    Psychosexual dysfunction with inhibited sexual excitement    Screening PSA (prostate specific antigen)    Per [facility]    Visual impairment    glasses     Past Surgical History:   Procedure Laterality Date    Arthroscopy of joint unlisted Left     knee plica removed    Colonoscopy N/A 10/02/2019    Procedure: COLONOSCOPY;  Surgeon: Florentino Saavedra MD;  Location: [facility]    Exc back leandro deep < 5 cm  09/13/2024    Excision Mass Back    Other surgical history      Nasal polyp removal    Other surgical history      RIGHT ACL/MCL REPAIR     Current Outpatient Medications   Medication Sig Dispense Refill    emtricitabine-tenofovir 200-300  MG Oral Tab Take 1 tablet by mouth daily.      Meloxicam 10 MG Oral Cap Take 10 mg by mouth daily.       Allergies[1]  Family History   Problem Relation Age of Onset    Hypertension Father     Other (Other) Father         DEMENTIA    Seizure Disorder Mother     Anemia Mother     Other (Other) Mother         Lymphoma    Colon Cancer Other         Close relative       Social History     Occupational History    Occupation: self employed   Tobacco Use    Smoking status: Never    Smokeless tobacco: Never   Vaping Use    Vaping status: Never Used   Substance and Sexual Activity    Alcohol use: Yes     Alcohol/week: 10.0 standard drinks of alcohol     Types: 10 Standard drinks or equivalent per week     Comment: occasional    Drug use: No    Sexual activity: Not on file        Review of Systems:  GENERAL: feels generally well, no significant weight loss or weight gain  SKIN: no ulcerated or worrisome skin lesions  EYES:denies blurred vision or double vision  HEENT: denies new nasal congestion, sinus pain or ST  LUNGS: denies shortness of breath  CARDIOVASCULAR: denies chest pain  GI: no hematemesis, no worsening heartburn, no diarrhea  : no dysuria, no blood in urine, no difficulty urinating, no incontinence  MUSCULOSKELETAL: no other musculoskeletal complaints other than in HPI  NEURO: no numbness or tingling, no weakness or balance disorder  PSYCHE: no depression or anxiety  HEMATOLOGIC: no hx of blood dyscrasia  ENDOCRINE: no thyroid or diabetes issues  ALL/ASTHMA: no new hx of severe allergy or asthma    Physical Examination:    There were no vitals taken for this visit.  Constitutional: appears well hydrated, alert and responsive, no acute distress noted  Extremities: Antalgic gait on the right.  Positive Stinchfield test.  Positive FADIR and LEIGH test.  0 degrees internal rotation, 10 degrees external rotation, 30 degrees abduction.  Neurological: Decree sensation in the left foot with history of prior laminectomy  and nerve damage.  Pulses: 2+    Imaging: Advanced osteoarthritis of the left hip with complete loss of joint space, osteophyte formation and subchondral cyst formation..     Lab Results   Component Value Date    WBC 5.2 06/13/2024    HGB 15.9 06/13/2024    .0 (L) 06/13/2024      Lab Results   Component Value Date    GLU 84 09/09/2024    BUN 19 09/09/2024    CREATSERUM 1.25 09/09/2024    GFRNAA 62 11/02/2021    GFRAA 72 11/02/2021        Assessment and Plan:  Diagnoses and all orders for this visit:    Primary osteoarthritis of left hip  -     XR HIP W OR WO PELVIS 2 OR 3 VIEWS, LEFT (CPT=73502) [[id number]] - Orthopedic providers only; Future        Assessment: He has advanced osteoarthritis of the left hip that has not responded to conservative management.  I discussed left total hip replacement.  We discussed the risks and indications for surgery as well as the common possible complications.  Our discussion included, but was not limited to the potential risks of anesthetic complication, infection, bleeding, DVT or PE, nerve or blood vessel injury, stiffness, the potential need for revision surgery, leg length inequality, as well as the potential risk of unanticipated perioperative medical or orthopedic complications. He would like to proceed.  Surgery has been scheduled pending medical clearance.      Plan: Surgery is planned via anterior approach.  He is scheduled pending medical and dental clearance.    The above note was creating using Dragon speech recognition technology. Please excuse any typos.    GIDEON JAEN MD       [1] No Known Allergies

## 2024-10-16 ENCOUNTER — LAB ENCOUNTER (OUTPATIENT)
Dept: LAB | Age: 54
End: 2024-10-16
Attending: FAMILY MEDICINE
Payer: COMMERCIAL

## 2024-10-16 DIAGNOSIS — M16.0 BILATERAL HIP JOINT ARTHRITIS: ICD-10-CM

## 2024-10-16 LAB
ANION GAP SERPL CALC-SCNC: 4 MMOL/L (ref 0–18)
BUN BLD-MCNC: 19 MG/DL (ref 9–23)
BUN/CREAT SERPL: 14.5 (ref 10–20)
CALCIUM BLD-MCNC: 10 MG/DL (ref 8.7–10.4)
CHLORIDE SERPL-SCNC: 105 MMOL/L (ref 98–112)
CO2 SERPL-SCNC: 32 MMOL/L (ref 21–32)
CREAT BLD-MCNC: 1.31 MG/DL
EGFRCR SERPLBLD CKD-EPI 2021: 64 ML/MIN/1.73M2 (ref 60–?)
FASTING STATUS PATIENT QL REPORTED: NO
GLUCOSE BLD-MCNC: 99 MG/DL (ref 70–99)
OSMOLALITY SERPL CALC.SUM OF ELEC: 294 MOSM/KG (ref 275–295)
POTASSIUM SERPL-SCNC: 4.4 MMOL/L (ref 3.5–5.1)
SODIUM SERPL-SCNC: 141 MMOL/L (ref 136–145)

## 2024-10-16 PROCEDURE — 80048 BASIC METABOLIC PNL TOTAL CA: CPT

## 2024-10-16 PROCEDURE — 36415 COLL VENOUS BLD VENIPUNCTURE: CPT

## 2024-10-22 ENCOUNTER — OFFICE VISIT (OUTPATIENT)
Dept: FAMILY MEDICINE CLINIC | Facility: CLINIC | Age: 54
End: 2024-10-22
Payer: COMMERCIAL

## 2024-10-22 VITALS
WEIGHT: 224 LBS | BODY MASS INDEX: 27.28 KG/M2 | HEART RATE: 74 BPM | HEIGHT: 76 IN | DIASTOLIC BLOOD PRESSURE: 72 MMHG | SYSTOLIC BLOOD PRESSURE: 138 MMHG | RESPIRATION RATE: 17 BRPM

## 2024-10-22 DIAGNOSIS — M16.0 BILATERAL HIP JOINT ARTHRITIS: ICD-10-CM

## 2024-10-22 DIAGNOSIS — R79.89 ELEVATED SERUM CREATININE: ICD-10-CM

## 2024-10-22 DIAGNOSIS — Z01.818 PREOP GENERAL PHYSICAL EXAM: Primary | ICD-10-CM

## 2024-10-22 DIAGNOSIS — I10 HYPERTENSION, UNSPECIFIED TYPE: ICD-10-CM

## 2024-10-22 PROCEDURE — 3075F SYST BP GE 130 - 139MM HG: CPT | Performed by: FAMILY MEDICINE

## 2024-10-22 PROCEDURE — 90471 IMMUNIZATION ADMIN: CPT | Performed by: FAMILY MEDICINE

## 2024-10-22 PROCEDURE — 90656 IIV3 VACC NO PRSV 0.5 ML IM: CPT | Performed by: FAMILY MEDICINE

## 2024-10-22 PROCEDURE — 3008F BODY MASS INDEX DOCD: CPT | Performed by: FAMILY MEDICINE

## 2024-10-22 PROCEDURE — 3078F DIAST BP <80 MM HG: CPT | Performed by: FAMILY MEDICINE

## 2024-10-22 PROCEDURE — 99243 OFF/OP CNSLTJ NEW/EST LOW 30: CPT | Performed by: FAMILY MEDICINE

## 2024-10-22 NOTE — PROGRESS NOTES
REASON FOR VISIT:    Josiah Baker is a 54 year old male who presents for an Annual Health Assessment.        Patient Active Problem List   Diagnosis    Cellulitis and abscess of hand, except fingers and thumb    Laboratory examination ordered as part of a routine general medical examination    Psychosexual dysfunction with inhibited sexual excitement    Unspecified sinusitis (chronic)    Chronic sinusitis    Erectile dysfunction    Antrochoanal polyp    Open wound of finger(s) , complicated    Elbow injury    Left foot pain    Laceration of forearm, left    Forearm laceration    Viral warts    Tinea corporis    Atrophy of muscle of right lower leg    Numbness and tingling    Observation for suspected condition     General Health           CAGE:           Depression Screening (PHQ-2/PHQ-9):  Over the LAST 2 WEEKS             PREVENTATIVE SERVICES  INDICATIONS AND SCHEDULE Recommendation Internal Lab or Procedure   Colonoscopy Screen Every 10 years Health Maintenance   Topic Date Due    Colorectal Cancer Screening  10/02/2029      Flex Sigmoidoscopy Screen  Every 5 years No results found for this or any previous visit.   Fecal Occult Blood  Annually No results found for: \"FOB\", \"OCCULTSTOOL\"   Obesity Screening Screen all adults annually Body mass index is 27.27 kg/m².     Preventive Services for Which Recommendations Vary with Risk Recommendation Internal Lab or Procedure   Cholesterol Screening Recommended screening varies with age, risk and gender LDL Cholesterol (mg/dL)   Date Value   08/21/2024 82     LDL CHOLESTROL (mg/dL)   Date Value   04/10/2012 63      Diabetes Screening  If history of high blood pressure or other  risk factors No results found for: \"A1C\"  Glucose (mg/dL)   Date Value   10/16/2024 99     GLUCOSE (mg/dL)   Date Value   01/10/2013 100 (H)        Gonorrhea Screening If high risk No results found for: \"GONOCOCCUS\"   HIV Screening For all adults age 18-65, older adults at increased risk No  results found for: \"HIV\"   Syphilis Screening Screen if pregnant or high risk Rapid Plasma Reagin (no units)   Date Value   07/01/2024 Nonreactive      Hepatitis C Screening Screen pts at high risk plus screen one time for adults born 1945 - 1965 Hepatitis C Virus Antibody (no units)   Date Value   12/01/2017 Nonreactive      Tuberculosis Screen If high risk No components found for: \"PPDINDURAT\"       SPECIFIC DISEASE MONITORING Internal Lab or Procedure   No disease specific diagnoses    ALLERGIES:   Allergies[1]  CURRENT MEDICATIONS:   Current Outpatient Medications   Medication Sig Dispense Refill    emtricitabine-tenofovir 200-300 MG Oral Tab Take 1 tablet by mouth daily.      Meloxicam 10 MG Oral Cap Take 10 mg by mouth daily.        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    History of laminectomy    ?    Knee injury    R ACL/MCL  s/p surgical repair    Laboratory examination ordered as part of a routine general medical examination    Lipid screening    Lipid screening    Per [facility]    PONV (postoperative nausea and vomiting)    Psychosexual dysfunction with inhibited sexual excitement    Screening PSA (prostate specific antigen)    Per [facility]    Visual impairment    glasses      Past Surgical History:   Procedure Laterality Date    Arthroscopy of joint unlisted Left     knee plica removed    Colonoscopy N/A 10/02/2019    Procedure: COLONOSCOPY;  Surgeon: Florentino Saavedra MD;  Location: [facility]    Exc back leandro deep < 5 cm  09/13/2024    Excision Mass Back    Other surgical history      Nasal polyp removal    Other  surgical history      RIGHT ACL/MCL REPAIR      Family History   Problem Relation Age of Onset    Hypertension Father     Other (Other) Father         DEMENTIA    Seizure Disorder Mother     Anemia Mother     Other (Other) Mother         Lymphoma    Colon Cancer Other         Close relative     NO FAMILY HISTORY OF PROSTATE OR COLON CANCER     SOCIAL HISTORY:   Social History     Socioeconomic History    Marital status: Single   Occupational History    Occupation: self employed   Tobacco Use    Smoking status: Never    Smokeless tobacco: Never   Vaping Use    Vaping status: Never Used   Substance and Sexual Activity    Alcohol use: Yes     Alcohol/week: 10.0 standard drinks of alcohol     Types: 10 Standard drinks or equivalent per week     Comment: occasional    Drug use: No   Other Topics Concern    Caffeine Concern Yes     Comment: 1-2 cups daily, (coffee, soda)    Exercise Yes     Comment: 3-5 x's a week    Reaction to local anesthetic No   Social History Narrative    Going to hospitals on business.              Social Drivers of Health     Financial Resource Strain: Not on File (2023)    Received from [facility]    Financial Resource Strain     Financial Resource Strain: 0   Food Insecurity: Not on File (2024)    Received from [facility]    Food Insecurity     Food: 0   Transportation Needs: Not on File (2023)    Received from [facility]    Transportation Needs     Transportation: 0   Physical Activity: Not on File (2023)    Received from [facility]    Physical Activity     Physical Activity: 0   Stress: Not on File (2023)    Received from [facility]    Stress     Stress: 0   Social Connections: Not on File (2024)    Received from [facility]    Social Connections     Connectedness: 0   Housing Stability: Not on File (2023)    Received from [facility]    Housing Stability     Housin     Occ:  :       REVIEW OF SYSTEMS:   GENERAL: feels well otherwise  SKIN: denies any  unusual skin lesions  EYES: denies blurred vision or double vision  HEENT: denies nasal congestion, sinus pain or ST  LUNGS: denies shortness of breath with exertion  CARDIOVASCULAR: denies chest pain on exertion  GI: denies abdominal pain, denies heartburn  : denies nocturia or changes in stream  MUSCULOSKELETAL: denies back pain  NEURO: denies headaches  PSYCHE: denies depression or anxiety  HEMATOLOGIC: denies hx of anemia  ENDOCRINE: denies thyroid history  ALL/ASTHMA: denies hx of allergy or asthma  NO BLOOD IN STOOL  EXAM:   /72   Pulse 74   Resp 17   Ht 6' 4\" (1.93 m)   Wt 224 lb (101.6 kg)   BMI 27.27 kg/m²   >   BP Readings from Last 3 Encounters:   10/22/24 138/72   09/13/24 (!) 140/93   09/12/24 130/70        GENERAL: well developed, well nourished, in no apparent distress   SKIN: no rashes, no suspicious lesions  HEENT: atraumatic, normocephalic, ears and throat are clear  EYES:PERRLA, EOMI, conjunctiva are clear.    NECK: supple, no adenopathy, no bruits  CHEST: no chest tenderness  LUNGS: clear to auscultation  CARDIO: RRR without murmur  GI: good BS's, no masses, HSM or tenderness  : two descended testes, no masses, no hernia, no penile lesions  RECTAL: deferred  MUSCULOSKELETAL: back is not tender, FROM of the back  EXTREMITIES: no cyanosis, clubbing or edema  NEURO: Oriented times three, cranial nerves are intact, motor and sensory are grossly intact         ASSESSMENT AND OTHER RELEVANT CHRONIC CONDITIONS:   Josiah Baker is a 54 year old male who presents for an Annual Health Assessment.     PLAN SUMMARY:   Diagnoses and all orders for this visit:    Preop general physical exam  -     Comp Metabolic Panel (14); Future  -     CBC With Differential With Platelet; Future  -     MRSA/SA Scrn by PCR; Future  -     XR CHEST PA + LAT CHEST (CPT=71046); Future  -     EKG 12 Lead; Future  -     Urinalysis with Culture Reflex    Bilateral hip joint arthritis    Hypertension, unspecified  type    Elevated serum creatinine    Other orders  -     Fluzone trivalent vaccine, PF 0.5mL, 6mo+ (48948)       The patient indicates understanding of these issues and agrees to the plan.  No follow-ups on file.  Exercise counseling perfomed    SUGGESTED VACCINATIONS - Influenza, Pneumococcal, Zoster, Tetanus, HPV   Influenza: No recommendations at this time  Pneumonia: No recommendations at this time  HPV: No recommendations at this time  Tdap: No recommendations at this time  Shingles:      Influenza Annually   Pneumococcal if high risk   Td/Tdap once then every 10 years   HPV Males 11-21   Zoster (Shingles) 60 and older: one dose   Varicella 2 doses if not immune   MMR 1-2 doses if born after 1958 and not immune   1. Preop general physical exam    - Comp Metabolic Panel (14); Future  - CBC With Differential With Platelet; Future  - MRSA/SA Scrn by PCR; Future  - XR CHEST PA + LAT CHEST (CPT=71046); Future  - EKG 12 Lead; Future  - Urinalysis with Culture Reflex    2. Bilateral hip joint arthritis  OR scheduled in 28 days    3. Hypertension, unspecified type  Controlled off meds    4. Elevated serum creatinine  Labs ordered    Patient recalls having nausea and vomiting with nitrous oxide that was given prior to his back surgery.  Will forward that information to orthopedics after testing results are in  Clearance letter to Dr. Fernandez Aguayo        [1] No Known Allergies

## 2024-10-28 ENCOUNTER — APPOINTMENT (OUTPATIENT)
Dept: LAB | Age: 54
End: 2024-10-28
Attending: FAMILY MEDICINE
Payer: COMMERCIAL

## 2024-10-28 ENCOUNTER — PATIENT MESSAGE (OUTPATIENT)
Dept: FAMILY MEDICINE CLINIC | Facility: CLINIC | Age: 54
End: 2024-10-28

## 2024-10-28 ENCOUNTER — HOSPITAL ENCOUNTER (OUTPATIENT)
Dept: GENERAL RADIOLOGY | Age: 54
Discharge: HOME | End: 2024-10-28
Attending: FAMILY MEDICINE
Payer: COMMERCIAL

## 2024-10-28 DIAGNOSIS — Z01.818 PREOP GENERAL PHYSICAL EXAM: ICD-10-CM

## 2024-10-28 PROCEDURE — 71046 X-RAY EXAM CHEST 2 VIEWS: CPT | Performed by: FAMILY MEDICINE

## 2024-10-30 ENCOUNTER — EKG ENCOUNTER (OUTPATIENT)
Dept: LAB | Age: 54
End: 2024-10-30
Attending: FAMILY MEDICINE
Payer: COMMERCIAL

## 2024-10-30 ENCOUNTER — LAB ENCOUNTER (OUTPATIENT)
Dept: LAB | Age: 54
End: 2024-10-30
Attending: FAMILY MEDICINE
Payer: COMMERCIAL

## 2024-10-30 DIAGNOSIS — Z01.818 PREOP GENERAL PHYSICAL EXAM: ICD-10-CM

## 2024-10-30 LAB
ALBUMIN SERPL-MCNC: 4.4 G/DL (ref 3.2–4.8)
ALBUMIN/GLOB SERPL: 1.6 {RATIO} (ref 1–2)
ALP LIVER SERPL-CCNC: 76 U/L
ALT SERPL-CCNC: 26 U/L
ANION GAP SERPL CALC-SCNC: 5 MMOL/L (ref 0–18)
AST SERPL-CCNC: 28 U/L (ref ?–34)
ATRIAL RATE: 58 BPM
BASOPHILS # BLD AUTO: 0.02 X10(3) UL (ref 0–0.2)
BASOPHILS NFR BLD AUTO: 0.5 %
BILIRUB SERPL-MCNC: 1 MG/DL (ref 0.3–1.2)
BILIRUB UR QL: NEGATIVE
BUN BLD-MCNC: 18 MG/DL (ref 9–23)
BUN/CREAT SERPL: 14.8 (ref 10–20)
CALCIUM BLD-MCNC: 9.9 MG/DL (ref 8.7–10.4)
CHLORIDE SERPL-SCNC: 108 MMOL/L (ref 98–112)
CLARITY UR: CLEAR
CO2 SERPL-SCNC: 29 MMOL/L (ref 21–32)
CREAT BLD-MCNC: 1.22 MG/DL
DEPRECATED RDW RBC AUTO: 41 FL (ref 35.1–46.3)
EGFRCR SERPLBLD CKD-EPI 2021: 70 ML/MIN/1.73M2 (ref 60–?)
EOSINOPHIL # BLD AUTO: 0.11 X10(3) UL (ref 0–0.7)
EOSINOPHIL NFR BLD AUTO: 2.8 %
ERYTHROCYTE [DISTWIDTH] IN BLOOD BY AUTOMATED COUNT: 12.4 % (ref 11–15)
FASTING STATUS PATIENT QL REPORTED: YES
GLOBULIN PLAS-MCNC: 2.7 G/DL (ref 2–3.5)
GLUCOSE BLD-MCNC: 92 MG/DL (ref 70–99)
GLUCOSE UR-MCNC: NORMAL MG/DL
HCT VFR BLD AUTO: 46.1 %
HGB BLD-MCNC: 16.6 G/DL
HGB UR QL STRIP.AUTO: NEGATIVE
IMM GRANULOCYTES # BLD AUTO: 0.01 X10(3) UL (ref 0–1)
IMM GRANULOCYTES NFR BLD: 0.3 %
KETONES UR-MCNC: NEGATIVE MG/DL
LEUKOCYTE ESTERASE UR QL STRIP.AUTO: NEGATIVE
LYMPHOCYTES # BLD AUTO: 1.37 X10(3) UL (ref 1–4)
LYMPHOCYTES NFR BLD AUTO: 34.4 %
MCH RBC QN AUTO: 32.5 PG (ref 26–34)
MCHC RBC AUTO-ENTMCNC: 36 G/DL (ref 31–37)
MCV RBC AUTO: 90.4 FL
MONOCYTES # BLD AUTO: 0.32 X10(3) UL (ref 0.1–1)
MONOCYTES NFR BLD AUTO: 8 %
MRSA NASAL: NEGATIVE
NEUTROPHILS # BLD AUTO: 2.15 X10 (3) UL (ref 1.5–7.7)
NEUTROPHILS # BLD AUTO: 2.15 X10(3) UL (ref 1.5–7.7)
NEUTROPHILS NFR BLD AUTO: 54 %
NITRITE UR QL STRIP.AUTO: NEGATIVE
OSMOLALITY SERPL CALC.SUM OF ELEC: 296 MOSM/KG (ref 275–295)
P AXIS: 35 DEGREES
P-R INTERVAL: 180 MS
PH UR: 6.5 [PH] (ref 5–8)
PLATELET # BLD AUTO: 125 10(3)UL (ref 150–450)
POTASSIUM SERPL-SCNC: 4.4 MMOL/L (ref 3.5–5.1)
PROT SERPL-MCNC: 7.1 G/DL (ref 5.7–8.2)
PROT UR-MCNC: NEGATIVE MG/DL
Q-T INTERVAL: 400 MS
QRS DURATION: 94 MS
QTC CALCULATION (BEZET): 392 MS
R AXIS: 40 DEGREES
RBC # BLD AUTO: 5.1 X10(6)UL
SODIUM SERPL-SCNC: 142 MMOL/L (ref 136–145)
SP GR UR STRIP: 1.02 (ref 1–1.03)
STAPH A BY PCR: NEGATIVE
T AXIS: 42 DEGREES
UROBILINOGEN UR STRIP-ACNC: NORMAL
VENTRICULAR RATE: 58 BPM
WBC # BLD AUTO: 4 X10(3) UL (ref 4–11)

## 2024-10-30 PROCEDURE — 87640 STAPH A DNA AMP PROBE: CPT

## 2024-10-30 PROCEDURE — 93010 ELECTROCARDIOGRAM REPORT: CPT | Performed by: STUDENT IN AN ORGANIZED HEALTH CARE EDUCATION/TRAINING PROGRAM

## 2024-10-30 PROCEDURE — 87641 MR-STAPH DNA AMP PROBE: CPT

## 2024-10-30 PROCEDURE — 81003 URINALYSIS AUTO W/O SCOPE: CPT | Performed by: FAMILY MEDICINE

## 2024-10-30 PROCEDURE — 36415 COLL VENOUS BLD VENIPUNCTURE: CPT

## 2024-10-30 PROCEDURE — 93005 ELECTROCARDIOGRAM TRACING: CPT

## 2024-10-30 PROCEDURE — 80053 COMPREHEN METABOLIC PANEL: CPT

## 2024-10-30 PROCEDURE — 85025 COMPLETE CBC W/AUTO DIFF WBC: CPT

## 2024-11-11 ENCOUNTER — OFFICE VISIT (OUTPATIENT)
Dept: ORTHOPEDICS CLINIC | Facility: CLINIC | Age: 54
End: 2024-11-11
Payer: COMMERCIAL

## 2024-11-11 VITALS — HEIGHT: 75 IN | WEIGHT: 227.19 LBS | BODY MASS INDEX: 28.25 KG/M2

## 2024-11-11 DIAGNOSIS — M16.12 PRIMARY OSTEOARTHRITIS OF LEFT HIP: Primary | ICD-10-CM

## 2024-11-11 PROCEDURE — 3008F BODY MASS INDEX DOCD: CPT | Performed by: PHYSICIAN ASSISTANT

## 2024-11-11 PROCEDURE — 99213 OFFICE O/P EST LOW 20 MIN: CPT | Performed by: PHYSICIAN ASSISTANT

## 2024-11-11 NOTE — PROGRESS NOTES
NURSING INTAKE COMMENTS:   Chief Complaint   Patient presents with    Pre-Op     L JANY scheduled sx on 11/19/2024       HPI: This 54 year old male presents today for preoperative consultation.  He is scheduled for left total hip arthroplasty through an anterior approach next week.  He is anxious to proceed with surgery.  He has stopped his meloxicam and is noted increased pain and stiffness in the hip.  He has seen his providers.    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    History of laminectomy    ?    Knee injury    R ACL/MCL  s/p surgical repair    Laboratory examination ordered as part of a routine general medical examination    Lipid screening    Lipid screening    Per [facility]    PONV (postoperative nausea and vomiting)    Psychosexual dysfunction with inhibited sexual excitement    Screening PSA (prostate specific antigen)    Per [facility]    Visual impairment    glasses     Past Surgical History:   Procedure Laterality Date    Arthroscopy of joint unlisted Left     knee plica removed    Colonoscopy N/A 10/02/2019    Procedure: COLONOSCOPY;  Surgeon: Florentino Saavedra MD;  Location: [facility]    Exc back leandro deep < 5 cm  09/13/2024    Excision Mass Back    Other surgical history      Nasal polyp removal    Other surgical history      RIGHT ACL/MCL REPAIR     Current Outpatient Medications   Medication Sig Dispense Refill    emtricitabine-tenofovir 200-300 MG Oral Tab Take 1 tablet by mouth daily.      Meloxicam 10 MG Oral Cap Take 10 mg by mouth daily.       Allergies[1]  Family History   Problem Relation Age of Onset    Hypertension Father      Other (Other) Father         DEMENTIA    Seizure Disorder Mother     Anemia Mother     Other (Other) Mother         Lymphoma    Colon Cancer Other         Close relative       Social History     Occupational History    Occupation: self employed   Tobacco Use    Smoking status: Never    Smokeless tobacco: Never   Vaping Use    Vaping status: Never Used   Substance and Sexual Activity    Alcohol use: Yes     Alcohol/week: 10.0 standard drinks of alcohol     Types: 10 Standard drinks or equivalent per week     Comment: occasional    Drug use: No    Sexual activity: Not on file        Review of Systems:  GENERAL: feels generally well, no significant weight loss or weight gain  SKIN: no ulcerated or worrisome skin lesions  EYES:denies blurred vision or double vision  HEENT: denies new nasal congestion, sinus pain or ST  LUNGS: denies shortness of breath  CARDIOVASCULAR: denies chest pain  GI: no hematemesis, no worsening heartburn, no diarrhea  : no dysuria, no blood in urine, no difficulty urinating, no incontinence  MUSCULOSKELETAL: no other musculoskeletal complaints other than in HPI  NEURO: no numbness or tingling, no weakness or balance disorder  PSYCHE: no depression or anxiety  HEMATOLOGIC: no hx of blood dyscrasia  ENDOCRINE: no thyroid or diabetes issues  ALL/ASTHMA: no new hx of severe allergy or asthma    Physical Examination:    Ht 6' 3\" (1.905 m)   Wt 227 lb 3.2 oz (103.1 kg)   BMI 28.40 kg/m²   Constitutional: appears well hydrated, alert and responsive, no acute distress noted  Extremities: His exam is unchanged.  Musculoskeletal: ntalgic gait on the right.  Positive Stinchfield test.  Positive FADIR and LEIGH test.  0 degrees internal rotation, 10 degrees external rotation, 30 degrees abduction.  Neurological: Decree sensation in the left foot with history of prior laminectomy and nerve damage.  Pulses: 2+      Imaging: XR CHEST PA + LAT CHEST (CPT=71046)    Result Date: 10/28/2024  PROCEDURE: XR  CHEST PA + LAT CHEST (CPT=71046)  COMPARISON: [facility], XR CHEST PA + LAT CHEST (CPT=71046), 8/31/2020, 10:55 AM.  INDICATIONS: Pre operative clearance for Lt hip replacement 3 weeks.  Congestion for 4 days.  TECHNIQUE:   Two views.   Impression:  Normal heart size, clear lungs, normal pleura Finalized by (CST): Kev Horowitz MD on 10/28/2024 at 3:44 PM          XR HIP W OR WO PELVIS 2 OR 3 VIEWS, LEFT (CPT=73502)    Result Date: 10/15/2024  PROCEDURE: XR HIP W OR WO PELVIS 2 OR 3 VIEWS, LEFT (CPT=73502)  COMPARISON: None.  INDICATIONS: chronic pain of left hip. no trauma  TECHNIQUE: AP weight-bearing pelvis AP and oblique weight-bearing left hip views were obtained.   FINDINGS:  BONES: No acute fracture or dislocation. Moderate-severe left hip osteoarthritis with joint space narrowing marginal spur formation and bone-on-bone appearance lateral articular margin with subchondral bony changes.  Moderate right hip osteoarthritis.  Dextroscoliosis and advanced lumbar sacral spondylosis SOFT TISSUES: Negative. No visible soft tissue swelling. EFFUSION: None visible. OTHER: Moderate stool throughout the colon consistent with constipation/fecal retention.         CONCLUSION:  1. Moderate-severe left hip osteoarthritis. 2. Moderate right hip osteoarthritis. 3. Dextroscoliosis and advanced lumbar sacral spondylosis  4. No acute fracture or dislocation. 5.    Dictated by (CST): Rikki Tinajero MD on 10/15/2024 at 7:49 AM     Finalized by (CST): Rikki Tinajero MD on 10/15/2024 at 7:51 AM             Lab Results   Component Value Date    WBC 4.0 10/30/2024    HGB 16.6 10/30/2024    .0 (L) 10/30/2024      Lab Results   Component Value Date    GLU 92 10/30/2024    BUN 18 10/30/2024    CREATSERUM 1.22 10/30/2024    GFRNAA 62 11/02/2021    GFRAA 72 11/02/2021        Assessment and Plan:  Diagnoses and all orders for this visit:    Primary osteoarthritis of left hip        Plan:  Josiah is scheduled for a left total hip arthroplasty through an anterior approach next week.  We discussed the surgery and the expected risks and benefits.  I answered all of his questions.  He has received his clearances.  We will proceed with surgery as scheduled next week.    The above note was creating using Dragon speech recognition technology. Please excuse any typos.    This visit was performed under the supervision of Dr. Fernandez Aguayo who formulated the treatment plan and decision making.        [1] No Known Allergies

## 2024-11-13 RX ORDER — ACETAMINOPHEN 500 MG
500 TABLET ORAL EVERY 6 HOURS PRN
COMMUNITY

## 2024-11-14 ENCOUNTER — LAB ENCOUNTER (OUTPATIENT)
Dept: LAB | Age: 54
End: 2024-11-14
Attending: ORTHOPAEDIC SURGERY
Payer: COMMERCIAL

## 2024-11-14 DIAGNOSIS — Z01.818 PREOPERATIVE TESTING: ICD-10-CM

## 2024-11-14 LAB
ANTIBODY SCREEN: NEGATIVE
RH BLOOD TYPE: NEGATIVE

## 2024-11-14 PROCEDURE — 86900 BLOOD TYPING SEROLOGIC ABO: CPT

## 2024-11-14 PROCEDURE — 86850 RBC ANTIBODY SCREEN: CPT

## 2024-11-14 PROCEDURE — 86901 BLOOD TYPING SEROLOGIC RH(D): CPT

## 2024-11-14 PROCEDURE — 36415 COLL VENOUS BLD VENIPUNCTURE: CPT

## 2024-11-18 NOTE — H&P
[facility]  part of [facility]    History & Physical    Josiah Baker Patient Status:  Outpatient in a Bed    1970 MRN [id number]   Location [facility] OPERATING ROOM Attending Fernandez Aguayo, *   Hosp Day # 0 PCP Rikki Galeana, DO     Date:  2024    History provided by:patient  Chief Complaint:   Left hip pain    HPI:   Josiah Baker is a(n) 54 year old male. He presents with complaints of Left hip pain.  He finds decreasing standing tolerance so that when he is standing he is always looking for a place to sit, and walking is difficult as well.  He has had 2 cortisone injections with partial temporary relief, and has become dependent on meloxicam, though he had a blood test which showed an increase in creatinine and had to stop it for a while.     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    History of laminectomy    ?    Hx of motion sickness    Knee injury    R ACL/MCL  s/p surgical repair    Laboratory examination ordered as part of a routine general medical examination    Lipid screening    Lipid screening    Per [employer]    PONV (postoperative nausea and vomiting)    Psychosexual dysfunction with inhibited sexual excitement    Screening PSA (prostate specific antigen)    Per [employer]    Visual impairment    glasses     Past Surgical History:   Procedure Laterality Date    Arthroscopy of joint unlisted Left     knee plica removed    Colonoscopy N/A 10/02/2019    Procedure: COLONOSCOPY;  Surgeon: Florentino Saavedra MD;  Location: [facility] back leandro deep < 5 cm   2024    Excision Mass Back    Other surgical history      Nasal polyp removal    Other surgical history      RIGHT ACL/MCL REPAIR     Family History   Problem Relation Age of Onset    Hypertension Father     Other (Other) Father         DEMENTIA    Seizure Disorder Mother     Anemia Mother     Other (Other) Mother         Lymphoma    Colon Cancer Other         Close relative     Social History:  Social History     Socioeconomic History    Marital status: Single   Occupational History    Occupation: self employed   Tobacco Use    Smoking status: Never    Smokeless tobacco: Never   Vaping Use    Vaping status: Never Used   Substance and Sexual Activity    Alcohol use: Yes     Alcohol/week: 10.0 standard drinks of alcohol     Types: 10 Standard drinks or equivalent per week     Comment: occasional    Drug use: Yes     Types: Cannabis     Comment: last time:    Other Topics Concern    Caffeine Concern Yes     Comment: 1-2 cups daily, (coffee, soda)    Exercise Yes     Comment: 3-5 x's a week    Reaction to local anesthetic No   Social History Narrative    Going to [facility] on business.              Social Drivers of Health     Financial Resource Strain: Not on File (2023)    Received from [facility]    Financial Resource Strain     Financial Resource Strain: 0   Food Insecurity: Not on File (2024)    Received from [facility]    Food Insecurity     Food: 0   Transportation Needs: Not on File (2023)    Received from [facility]    Transportation Needs     Transportation: 0   Physical Activity: Not on File (2023)    Received from [facility]    Physical Activity     Physical Activity: 0   Stress: Not on File (2023)    Received from [facility]    Stress     Stress: 0   Social Connections: Not on File (2024)    Received from [facility]    Social Connections     Connectedness: 0   Housing Stability: Not on File (2023)    Received from [facility]    Housing Stability     Housin      Allergies/Medications:   Allergies: Allergies[1]  No medications prior to admission.       Review of Systems:   Pertinent items are noted in HPI.    Physical Exam:   Vital Signs:  Height 6' 4\" (1.93 m), weight 225 lb (102.1 kg).     General appearance: alert, appears stated age and cooperative  Extremities:  Antalgic gait on the right.  Positive Stinchfield test.  Positive FADIR and LEIGH test.  0 degrees internal rotation, 10 degrees external rotation, 30 degrees abduction.  Neurological: Decree sensation in the left foot with history of prior laminectomy and nerve damage.  Pulses: 2+          Results:     Lab Results   Component Value Date    WBC 4.0 10/30/2024    HGB 16.6 10/30/2024    HCT 46.1 10/30/2024    .0 (L) 10/30/2024    CREATSERUM 1.22 10/30/2024    BUN 18 10/30/2024     10/30/2024    K 4.4 10/30/2024     10/30/2024    CO2 29.0 10/30/2024    GLU 92 10/30/2024    CA 9.9 10/30/2024    ALB 4.4 10/30/2024    ALKPHO 76 10/30/2024    BILT 1.0 10/30/2024    TP 7.1 10/30/2024    AST 28 10/30/2024    ALT 26 10/30/2024    PTT 27.3 01/23/2023    INR 0.99 01/23/2023    TSH 0.898 08/21/2024    PSA 1.0 02/22/2018    ESRML 3 06/13/2024    CRP <0.40 06/13/2024     06/13/2024    RPR Nonreactive 07/01/2024       Imaging: Advanced osteoarthritis of the left hip with complete loss of joint space, osteophyte formation and subchondral cyst formation..     No results found.        Assessment/Plan:     He has advanced osteoarthritis of the left hip that has not responded to conservative management.  We discussed left total hip replacement.  We discussed the risks and indications for surgery as well as the common possible complications.  Our discussion included, but was not limited to the potential risks of anesthetic complication, infection, bleeding, DVT or PE, nerve or blood vessel injury, stiffness, the potential need for revision surgery, leg length inequality, as well as the potential risk of  unanticipated perioperative medical or orthopedic complications. He would like to proceed.  He has received his clearances.      YARIEL SCANLON PA-C  11/18/2024       [1] No Known Allergies

## 2024-11-19 ENCOUNTER — ANESTHESIA (OUTPATIENT)
Dept: SURGERY | Facility: HOSPITAL | Age: 54
End: 2024-11-19
Payer: COMMERCIAL

## 2024-11-19 ENCOUNTER — APPOINTMENT (OUTPATIENT)
Dept: GENERAL RADIOLOGY | Facility: HOSPITAL | Age: 54
End: 2024-11-19
Attending: PHYSICIAN ASSISTANT
Payer: COMMERCIAL

## 2024-11-19 ENCOUNTER — HOSPITAL ENCOUNTER (OUTPATIENT)
Facility: HOSPITAL | Age: 54
Discharge: HOME HEALTH | End: 2024-11-20
Attending: ORTHOPAEDIC SURGERY | Admitting: ORTHOPAEDIC SURGERY
Payer: COMMERCIAL

## 2024-11-19 ENCOUNTER — ANESTHESIA EVENT (OUTPATIENT)
Dept: SURGERY | Facility: HOSPITAL | Age: 54
End: 2024-11-19
Payer: COMMERCIAL

## 2024-11-19 ENCOUNTER — APPOINTMENT (OUTPATIENT)
Dept: GENERAL RADIOLOGY | Facility: HOSPITAL | Age: 54
End: 2024-11-19
Attending: ORTHOPAEDIC SURGERY
Payer: COMMERCIAL

## 2024-11-19 DIAGNOSIS — G89.18 POSTOPERATIVE PAIN: Primary | ICD-10-CM

## 2024-11-19 DIAGNOSIS — Z01.818 PREOPERATIVE TESTING: ICD-10-CM

## 2024-11-19 DIAGNOSIS — M16.12 PRIMARY OSTEOARTHRITIS OF LEFT HIP: ICD-10-CM

## 2024-11-19 LAB
DEPRECATED RDW RBC AUTO: 41.1 FL (ref 35.1–46.3)
DIRECT COOMBS POLY: NEGATIVE
ERYTHROCYTE [DISTWIDTH] IN BLOOD BY AUTOMATED COUNT: 12.3 % (ref 11–15)
HCT VFR BLD AUTO: 40.7 %
HGB BLD-MCNC: 14.8 G/DL
MCH RBC QN AUTO: 33.3 PG (ref 26–34)
MCHC RBC AUTO-ENTMCNC: 36.4 G/DL (ref 31–37)
MCV RBC AUTO: 91.7 FL
PLATELET # BLD AUTO: 109 10(3)UL (ref 150–450)
PLATELETS.RETICULATED NFR BLD AUTO: 3.2 % (ref 0–7)
RBC # BLD AUTO: 4.44 X10(6)UL
RH BLOOD TYPE: NEGATIVE
WBC # BLD AUTO: 5.3 X10(3) UL (ref 4–11)

## 2024-11-19 PROCEDURE — 0SRB04A REPLACEMENT OF LEFT HIP JOINT WITH CERAMIC ON POLYETHYLENE SYNTHETIC SUBSTITUTE, UNCEMENTED, OPEN APPROACH: ICD-10-PCS | Performed by: ORTHOPAEDIC SURGERY

## 2024-11-19 PROCEDURE — 3079F DIAST BP 80-89 MM HG: CPT | Performed by: HOSPITALIST

## 2024-11-19 PROCEDURE — 3008F BODY MASS INDEX DOCD: CPT | Performed by: HOSPITALIST

## 2024-11-19 PROCEDURE — 99222 1ST HOSP IP/OBS MODERATE 55: CPT | Performed by: HOSPITALIST

## 2024-11-19 PROCEDURE — 76000 FLUOROSCOPY <1 HR PHYS/QHP: CPT | Performed by: ORTHOPAEDIC SURGERY

## 2024-11-19 PROCEDURE — 73501 X-RAY EXAM HIP UNI 1 VIEW: CPT | Performed by: PHYSICIAN ASSISTANT

## 2024-11-19 PROCEDURE — 3075F SYST BP GE 130 - 139MM HG: CPT | Performed by: HOSPITALIST

## 2024-11-19 DEVICE — FLAT PE  HC LINER Ø 36 / G
Type: IMPLANTABLE DEVICE | Site: HIP | Status: FUNCTIONAL
Brand: MPACT ACETABULAR SYSTEM

## 2024-11-19 DEVICE — AMISTEM-P STD STEM #6
Type: IMPLANTABLE DEVICE | Site: HIP | Status: FUNCTIONAL
Brand: AMISTEM-P

## 2024-11-19 DEVICE — ACETABULAR SHELL Ø60 TWO HOLES
Type: IMPLANTABLE DEVICE | Site: HIP | Status: FUNCTIONAL
Brand: MPACT ACETABULAR SYSTEM

## 2024-11-19 DEVICE — FEMORAL HEAD Ø 36 SIZE M
Type: IMPLANTABLE DEVICE | Site: HIP | Status: FUNCTIONAL
Brand: MECTACER BIOLOX DELTA FEMORAL BALL HEAD

## 2024-11-19 DEVICE — HIP REPLACEMENT WITH CERAMIC HEAD: Type: IMPLANTABLE DEVICE | Site: HIP

## 2024-11-19 DEVICE — CANCELLOUS BONE SCREW Ø 6.5 L 35
Type: IMPLANTABLE DEVICE | Site: HIP | Status: FUNCTIONAL
Brand: MPACT EXTENSION

## 2024-11-19 RX ORDER — MORPHINE SULFATE 1 MG/ML
INJECTION, SOLUTION EPIDURAL; INTRATHECAL; INTRAVENOUS
Status: COMPLETED | OUTPATIENT
Start: 2024-11-19 | End: 2024-11-19

## 2024-11-19 RX ORDER — CELECOXIB 200 MG/1
400 CAPSULE ORAL ONCE
Status: COMPLETED | OUTPATIENT
Start: 2024-11-19 | End: 2024-11-19

## 2024-11-19 RX ORDER — NALOXONE HYDROCHLORIDE 0.4 MG/ML
0.08 INJECTION, SOLUTION INTRAMUSCULAR; INTRAVENOUS; SUBCUTANEOUS
Status: ACTIVE | OUTPATIENT
Start: 2024-11-19 | End: 2024-11-20

## 2024-11-19 RX ORDER — DIPHENHYDRAMINE HYDROCHLORIDE 50 MG/ML
12.5 INJECTION INTRAMUSCULAR; INTRAVENOUS EVERY 4 HOURS PRN
Status: DISCONTINUED | OUTPATIENT
Start: 2024-11-19 | End: 2024-11-20

## 2024-11-19 RX ORDER — DIPHENHYDRAMINE HCL 25 MG
25 CAPSULE ORAL EVERY 4 HOURS PRN
Status: DISCONTINUED | OUTPATIENT
Start: 2024-11-19 | End: 2024-11-20

## 2024-11-19 RX ORDER — DIPHENHYDRAMINE HCL 25 MG
25 CAPSULE ORAL EVERY 4 HOURS PRN
Status: DISPENSED | OUTPATIENT
Start: 2024-11-19 | End: 2024-11-20

## 2024-11-19 RX ORDER — NALBUPHINE HYDROCHLORIDE 10 MG/ML
2.5 INJECTION INTRAMUSCULAR; INTRAVENOUS; SUBCUTANEOUS EVERY 4 HOURS PRN
Status: ACTIVE | OUTPATIENT
Start: 2024-11-19 | End: 2024-11-20

## 2024-11-19 RX ORDER — HYDROMORPHONE HYDROCHLORIDE 1 MG/ML
0.8 INJECTION, SOLUTION INTRAMUSCULAR; INTRAVENOUS; SUBCUTANEOUS EVERY 2 HOUR PRN
Status: DISCONTINUED | OUTPATIENT
Start: 2024-11-19 | End: 2024-11-20

## 2024-11-19 RX ORDER — HYDROMORPHONE HYDROCHLORIDE 1 MG/ML
0.4 INJECTION, SOLUTION INTRAMUSCULAR; INTRAVENOUS; SUBCUTANEOUS
Status: ACTIVE | OUTPATIENT
Start: 2024-11-19 | End: 2024-11-20

## 2024-11-19 RX ORDER — BUPIVACAINE HYDROCHLORIDE 7.5 MG/ML
INJECTION, SOLUTION INTRASPINAL
Status: COMPLETED | OUTPATIENT
Start: 2024-11-19 | End: 2024-11-19

## 2024-11-19 RX ORDER — MORPHINE SULFATE 4 MG/ML
2 INJECTION, SOLUTION INTRAMUSCULAR; INTRAVENOUS EVERY 10 MIN PRN
Status: DISCONTINUED | OUTPATIENT
Start: 2024-11-19 | End: 2024-11-19 | Stop reason: HOSPADM

## 2024-11-19 RX ORDER — DOCUSATE SODIUM 100 MG/1
100 CAPSULE, LIQUID FILLED ORAL 2 TIMES DAILY
Status: DISCONTINUED | OUTPATIENT
Start: 2024-11-19 | End: 2024-11-20

## 2024-11-19 RX ORDER — HYDROMORPHONE HYDROCHLORIDE 1 MG/ML
0.4 INJECTION, SOLUTION INTRAMUSCULAR; INTRAVENOUS; SUBCUTANEOUS EVERY 2 HOUR PRN
Status: DISCONTINUED | OUTPATIENT
Start: 2024-11-19 | End: 2024-11-20

## 2024-11-19 RX ORDER — LIDOCAINE HYDROCHLORIDE 10 MG/ML
INJECTION, SOLUTION INFILTRATION; PERINEURAL
Status: COMPLETED | OUTPATIENT
Start: 2024-11-19 | End: 2024-11-19

## 2024-11-19 RX ORDER — PROCHLORPERAZINE EDISYLATE 5 MG/ML
5 INJECTION INTRAMUSCULAR; INTRAVENOUS EVERY 8 HOURS PRN
Status: DISCONTINUED | OUTPATIENT
Start: 2024-11-19 | End: 2024-11-20

## 2024-11-19 RX ORDER — HYDROCODONE BITARTRATE AND ACETAMINOPHEN 7.5; 325 MG/1; MG/1
1 TABLET ORAL EVERY 6 HOURS PRN
Status: ACTIVE | OUTPATIENT
Start: 2024-11-19 | End: 2024-11-20

## 2024-11-19 RX ORDER — ACETAMINOPHEN 500 MG
1000 TABLET ORAL ONCE
Status: COMPLETED | OUTPATIENT
Start: 2024-11-19 | End: 2024-11-19

## 2024-11-19 RX ORDER — FERROUS SULFATE 325(65) MG
325 TABLET, DELAYED RELEASE (ENTERIC COATED) ORAL
Status: DISCONTINUED | OUTPATIENT
Start: 2024-11-20 | End: 2024-11-20

## 2024-11-19 RX ORDER — HYDROMORPHONE HYDROCHLORIDE 1 MG/ML
0.4 INJECTION, SOLUTION INTRAMUSCULAR; INTRAVENOUS; SUBCUTANEOUS EVERY 5 MIN PRN
Status: DISCONTINUED | OUTPATIENT
Start: 2024-11-19 | End: 2024-11-19 | Stop reason: HOSPADM

## 2024-11-19 RX ORDER — ACETAMINOPHEN 325 MG/1
650 TABLET ORAL EVERY 6 HOURS PRN
Status: ACTIVE | OUTPATIENT
Start: 2024-11-19 | End: 2024-11-20

## 2024-11-19 RX ORDER — HYDROCODONE BITARTRATE AND ACETAMINOPHEN 7.5; 325 MG/1; MG/1
2 TABLET ORAL EVERY 6 HOURS PRN
Status: ACTIVE | OUTPATIENT
Start: 2024-11-19 | End: 2024-11-20

## 2024-11-19 RX ORDER — POLYETHYLENE GLYCOL 3350 17 G/17G
17 POWDER, FOR SOLUTION ORAL DAILY PRN
Status: DISCONTINUED | OUTPATIENT
Start: 2024-11-19 | End: 2024-11-20

## 2024-11-19 RX ORDER — ONDANSETRON 2 MG/ML
4 INJECTION INTRAMUSCULAR; INTRAVENOUS EVERY 6 HOURS PRN
Status: DISCONTINUED | OUTPATIENT
Start: 2024-11-19 | End: 2024-11-20

## 2024-11-19 RX ORDER — VANCOMYCIN HYDROCHLORIDE
15 ONCE
Status: COMPLETED | OUTPATIENT
Start: 2024-11-19 | End: 2024-11-19

## 2024-11-19 RX ORDER — HYDROMORPHONE HYDROCHLORIDE 1 MG/ML
0.6 INJECTION, SOLUTION INTRAMUSCULAR; INTRAVENOUS; SUBCUTANEOUS EVERY 5 MIN PRN
Status: DISCONTINUED | OUTPATIENT
Start: 2024-11-19 | End: 2024-11-19 | Stop reason: HOSPADM

## 2024-11-19 RX ORDER — FAMOTIDINE 20 MG/1
20 TABLET, FILM COATED ORAL 2 TIMES DAILY
Status: DISCONTINUED | OUTPATIENT
Start: 2024-11-19 | End: 2024-11-20

## 2024-11-19 RX ORDER — MORPHINE SULFATE 10 MG/ML
6 INJECTION, SOLUTION INTRAMUSCULAR; INTRAVENOUS EVERY 10 MIN PRN
Status: DISCONTINUED | OUTPATIENT
Start: 2024-11-19 | End: 2024-11-19 | Stop reason: HOSPADM

## 2024-11-19 RX ORDER — TRANEXAMIC ACID 10 MG/ML
INJECTION, SOLUTION INTRAVENOUS AS NEEDED
Status: DISCONTINUED | OUTPATIENT
Start: 2024-11-19 | End: 2024-11-19 | Stop reason: SURG

## 2024-11-19 RX ORDER — HYDROMORPHONE HYDROCHLORIDE 1 MG/ML
0.6 INJECTION, SOLUTION INTRAMUSCULAR; INTRAVENOUS; SUBCUTANEOUS
Status: ACTIVE | OUTPATIENT
Start: 2024-11-19 | End: 2024-11-20

## 2024-11-19 RX ORDER — NALOXONE HYDROCHLORIDE 0.4 MG/ML
80 INJECTION, SOLUTION INTRAMUSCULAR; INTRAVENOUS; SUBCUTANEOUS AS NEEDED
Status: DISCONTINUED | OUTPATIENT
Start: 2024-11-19 | End: 2024-11-19 | Stop reason: HOSPADM

## 2024-11-19 RX ORDER — HYDROCODONE BITARTRATE AND ACETAMINOPHEN 7.5; 325 MG/1; MG/1
1 TABLET ORAL EVERY 6 HOURS PRN
Status: DISCONTINUED | OUTPATIENT
Start: 2024-11-19 | End: 2024-11-20

## 2024-11-19 RX ORDER — MORPHINE SULFATE 4 MG/ML
4 INJECTION, SOLUTION INTRAMUSCULAR; INTRAVENOUS EVERY 10 MIN PRN
Status: DISCONTINUED | OUTPATIENT
Start: 2024-11-19 | End: 2024-11-19 | Stop reason: HOSPADM

## 2024-11-19 RX ORDER — FAMOTIDINE 10 MG/ML
20 INJECTION, SOLUTION INTRAVENOUS 2 TIMES DAILY
Status: DISCONTINUED | OUTPATIENT
Start: 2024-11-19 | End: 2024-11-20

## 2024-11-19 RX ORDER — HYDROMORPHONE HYDROCHLORIDE 1 MG/ML
0.2 INJECTION, SOLUTION INTRAMUSCULAR; INTRAVENOUS; SUBCUTANEOUS EVERY 5 MIN PRN
Status: DISCONTINUED | OUTPATIENT
Start: 2024-11-19 | End: 2024-11-19 | Stop reason: HOSPADM

## 2024-11-19 RX ORDER — DIPHENHYDRAMINE HYDROCHLORIDE 50 MG/ML
25 INJECTION INTRAMUSCULAR; INTRAVENOUS ONCE AS NEEDED
Status: ACTIVE | OUTPATIENT
Start: 2024-11-19 | End: 2024-11-19

## 2024-11-19 RX ORDER — DIPHENHYDRAMINE HYDROCHLORIDE 50 MG/ML
12.5 INJECTION INTRAMUSCULAR; INTRAVENOUS EVERY 4 HOURS PRN
Status: ACTIVE | OUTPATIENT
Start: 2024-11-19 | End: 2024-11-20

## 2024-11-19 RX ORDER — PROCHLORPERAZINE EDISYLATE 5 MG/ML
5 INJECTION INTRAMUSCULAR; INTRAVENOUS ONCE AS NEEDED
Status: ACTIVE | OUTPATIENT
Start: 2024-11-19 | End: 2024-11-19

## 2024-11-19 RX ORDER — SENNOSIDES 8.6 MG
17.2 TABLET ORAL NIGHTLY
Status: DISCONTINUED | OUTPATIENT
Start: 2024-11-19 | End: 2024-11-20

## 2024-11-19 RX ORDER — BISACODYL 10 MG
10 SUPPOSITORY, RECTAL RECTAL
Status: DISCONTINUED | OUTPATIENT
Start: 2024-11-19 | End: 2024-11-20

## 2024-11-19 RX ORDER — SODIUM PHOSPHATE, DIBASIC AND SODIUM PHOSPHATE, MONOBASIC 7; 19 G/230ML; G/230ML
1 ENEMA RECTAL ONCE AS NEEDED
Status: DISCONTINUED | OUTPATIENT
Start: 2024-11-19 | End: 2024-11-20

## 2024-11-19 RX ORDER — SODIUM CHLORIDE, SODIUM LACTATE, POTASSIUM CHLORIDE, CALCIUM CHLORIDE 600; 310; 30; 20 MG/100ML; MG/100ML; MG/100ML; MG/100ML
INJECTION, SOLUTION INTRAVENOUS CONTINUOUS
Status: DISCONTINUED | OUTPATIENT
Start: 2024-11-19 | End: 2024-11-20

## 2024-11-19 RX ORDER — HALOPERIDOL 5 MG/ML
0.5 INJECTION INTRAMUSCULAR ONCE AS NEEDED
Status: ACTIVE | OUTPATIENT
Start: 2024-11-19 | End: 2024-11-19

## 2024-11-19 RX ORDER — ASPIRIN 325 MG
325 TABLET, DELAYED RELEASE (ENTERIC COATED) ORAL 2 TIMES DAILY
Status: DISCONTINUED | OUTPATIENT
Start: 2024-11-19 | End: 2024-11-20

## 2024-11-19 RX ORDER — SODIUM CHLORIDE, SODIUM LACTATE, POTASSIUM CHLORIDE, CALCIUM CHLORIDE 600; 310; 30; 20 MG/100ML; MG/100ML; MG/100ML; MG/100ML
INJECTION, SOLUTION INTRAVENOUS CONTINUOUS
Status: DISCONTINUED | OUTPATIENT
Start: 2024-11-19 | End: 2024-11-19 | Stop reason: HOSPADM

## 2024-11-19 RX ORDER — MIDAZOLAM HYDROCHLORIDE 1 MG/ML
INJECTION INTRAMUSCULAR; INTRAVENOUS AS NEEDED
Status: DISCONTINUED | OUTPATIENT
Start: 2024-11-19 | End: 2024-11-19 | Stop reason: SURG

## 2024-11-19 RX ORDER — ONDANSETRON 2 MG/ML
4 INJECTION INTRAMUSCULAR; INTRAVENOUS ONCE AS NEEDED
Status: ACTIVE | OUTPATIENT
Start: 2024-11-19 | End: 2024-11-19

## 2024-11-19 RX ORDER — SODIUM CHLORIDE 9 MG/ML
INJECTION, SOLUTION INTRAVENOUS CONTINUOUS
Status: DISCONTINUED | OUTPATIENT
Start: 2024-11-19 | End: 2024-11-20

## 2024-11-19 RX ADMIN — LIDOCAINE HYDROCHLORIDE 5 ML: 10 INJECTION, SOLUTION INFILTRATION; PERINEURAL at 08:34:00

## 2024-11-19 RX ADMIN — TRANEXAMIC ACID 1000 MG: 10 INJECTION, SOLUTION INTRAVENOUS at 10:06:00

## 2024-11-19 RX ADMIN — MORPHINE SULFATE 0.3 MG: 1 INJECTION, SOLUTION EPIDURAL; INTRATHECAL; INTRAVENOUS at 08:34:00

## 2024-11-19 RX ADMIN — TRANEXAMIC ACID 1000 MG: 10 INJECTION, SOLUTION INTRAVENOUS at 08:46:00

## 2024-11-19 RX ADMIN — MIDAZOLAM HYDROCHLORIDE 2 MG: 1 INJECTION INTRAMUSCULAR; INTRAVENOUS at 08:27:00

## 2024-11-19 RX ADMIN — SODIUM CHLORIDE, SODIUM LACTATE, POTASSIUM CHLORIDE, CALCIUM CHLORIDE: 600; 310; 30; 20 INJECTION, SOLUTION INTRAVENOUS at 10:37:00

## 2024-11-19 RX ADMIN — SODIUM CHLORIDE, SODIUM LACTATE, POTASSIUM CHLORIDE, CALCIUM CHLORIDE: 600; 310; 30; 20 INJECTION, SOLUTION INTRAVENOUS at 10:52:00

## 2024-11-19 RX ADMIN — BUPIVACAINE HYDROCHLORIDE 1.6 ML: 7.5 INJECTION, SOLUTION INTRASPINAL at 08:34:00

## 2024-11-19 NOTE — OPERATIVE REPORT
OPERATIVE REPORT     PREOPERATIVE DIAGNOSIS:  Osteoarthritis, left hip.  POSTOPERATIVE DIAGNOSIS:  Osteoarthritis, left hip.    PROCEDURE:  left total hip arthroplasty via anterior approach with C-arm control.     ASSISTANT:  Florentino Faustin PA-C.       INDICATIONS:  The patient is a 54 year old male with advanced osteoarthritis of the left hip that has not responded to conservative management.  After discussion of the options for treatment, he requested the above procedure.  Our discussion included, but was not limited to, the potential risks of anesthetic complication, infection, DVT or PE, leg length inequality, periprosthetic fracture, injuries to local nerves or blood vessels, bleeding requiring transfusion, periprosthetic fracture, as well as the risk of unanticipated perioperative medical or orthopedic complications.  Written consent was obtained.     OPERATIVE TECHNIQUE:  The patient was brought to the operating room and placed under spinal anesthesia.  Ancef, tranexamic acid, and vancomycin were administered prophylactically.  A time-out was performed.  The left hip and lower extremity were prepped and draped in the usual sterile fashion.  An oblique incision was made just distal and lateral to the ASIS and carried to the fascia over the TFL.  The fascia was divided in line with the skin incision.  The TFL was reflected posteriorly within its sheath, and the rectus was reflected anteriorly.  The anterior circumflex femoral vessels were identified, coagulated, and ligated, and an anterior capsulotomy was performed.  A femoral neck osteotomy was made at the appropriate level and orientation.  The femoral head was removed.  A Charnley retractor was placed within the hip capsule to facilitate exposure, and the acetabular labrum was excised.  The acetabulum was reamed sequentially to 60 mm, at which point good bleeding subchondral bone was obtained.  Depth of reaming as well as inclination and version were  verified with the C-arm.  A Medacta Mpact 2-hole acetabulum was inserted in the appropriate version and inclination and obtained a good press-fit.  A screw was placed in the ilium for supplementary fixation and obtained excellent purchase.  A highly cross-linked flat 36mm ID liner was locked into the shell, and attention was turned to the femur.       The pubofemoral and ischiofemoral ligaments were released, and the femur was externally rotated.  With no traction applied, the leg was extended externally, rotated, and adducted.  Standard retractors were placed around the proximal femur, and a box osteotome was used to open the femoral canal, followed by the canal opening broach.  The femur was then broached up to accept a Medacta AMIS size 6 broach, which obtained excellent press-fit with excellent rotational stability.  The standard trial neck was applied, and a trial reduction was performed with a 36 mm +0 head.  Check x-rays were taken with the C-arm and overlaid with the preoperative views, verifying appropriate leg length and offset.  The trial components were then removed, and the actual #6 stem was inserted in the femur.  An excellent press-fit was obtained with good rotational stability.  The  ceramic head matching the trial size was applied to a clean dry trunnion, and the hip was reduced.       A final radiograph was taken and verified excellent re-creation of leg length and offset.  The wound was irrigated and closed in routine fashion in layers over a Hemovac drain.  Sponge and instrument counts were correct at the end of the procedure.  Estimated blood loss was 300 mL.  There were no complications.  The routine specimens were sent to Pathology.  The patient was stable under the care of Anesthesia at the completion of the procedure.     GIDEON JEAN MD 11/19/2024

## 2024-11-19 NOTE — ANESTHESIA PROCEDURE NOTES
Spinal Block    Date/Time: 11/19/2024 8:34 AM    Performed by: More Pompa MD  Authorized by: More Pompa MD      General Information and Staff    Start Time:  11/19/2024 8:34 AM  End Time:  11/19/2024 8:40 AM  Anesthesiologist:  More Pompa MD  Performed by:  Anesthesiologist  Patient Location:  OR  Site identification: surface landmarks    Reason for Block: at surgeon's request, post-op pain management and surgical anesthesia    Preanesthetic Checklist: patient identified, IV checked, risks and benefits discussed, monitors and equipment checked, pre-op evaluation, timeout performed, anesthesia consent and sterile technique used      Procedure Details    Patient Position:  Sitting  Prep: ChloraPrep and patient draped    Monitoring:  Cardiac monitor, continuous pulse ox and heart rate  Approach:  Midline  Location:  L3-4  Injection Technique:  Single-shot    Needle    Needle Type:  Pencil-tip  Needle Gauge:  24 G  Needle Length:  3.5 in    Assessment    Sensory Level:  T6  Events: clear CSF, CSF aspirated, well tolerated and blood negative      Additional Comments

## 2024-11-19 NOTE — INTERVAL H&P NOTE
Pre-op Diagnosis: Primary osteoarthritis of left hip [M16.12]    The above referenced H&P was reviewed by GIDEON JEAN MD on 11/19/2024, the patient was examined and no significant changes have occurred in the patient's condition since the H&P was performed.  I discussed with the patient and/or legal representative the potential benefits, risks and side effects of this procedure; the likelihood of the patient achieving goals; and potential problems that might occur during recuperation.  I discussed reasonable alternatives to the procedure, including risks, benefits and side effects related to the alternatives and risks related to not receiving this procedure.  We will proceed with procedure as planned.

## 2024-11-19 NOTE — PHYSICAL THERAPY NOTE
PHYSICAL THERAPY HIP EVALUATION - INPATIENT     Room Number: Room 6/Room 6-A  Evaluation Date: 11/19/2024  Type of Evaluation: Initial  Physician Order: PT Eval and Treat    Presenting Problem: s/p L JANY, anterior approach on 11/19     Reason for Therapy: Mobility Dysfunction and Discharge Planning    PHYSICAL THERAPY ASSESSMENT   Patient is a 54 year old male admitted 11/19/2024 for L JANY, anterior approach.  Prior to admission, patient's baseline is independent with ADLs and functional mobility without assistive device.  Patient is currently functioning below baseline with bed mobility, transfers, gait, stair negotiation, standing prolonged periods, and performing household tasks.  Patient is requiring stand-by assist as a result of the following impairments: decreased functional strength, pain, impaired dynamic standing balance, decreased muscular endurance, medical status, and limited L hip ROM.  Physical Therapy will continue to follow for duration of hospitalization.    Patient will benefit from continued skilled PT Services at discharge to promote prior level of function and safety with additional support and return home with home health PT.    PLAN DURING HOSPITALIZATION  Nursing Mobility Recommendation : 1 Assist  PT Device Recommendation: Rolling walker  PT Treatment Plan: Bed mobility;Body mechanics;Endurance;Energy conservation;Patient education;Gait training;Strengthening;Transfer training;Balance training;Stoop training  Rehab Potential : Good  Frequency (Obs): Daily     PHYSICAL THERAPY MEDICAL/SOCIAL HISTORY     Problem List  Principal Problem:    Primary osteoarthritis of left hip    HOME SITUATION  Type of Home: House  Home Layout: One level  Stairs to Bedroom: 1      Lives With: Alone (sister staying after DC)    Drives: Yes   Patient Regularly Uses: Glasses     Prior Level of Crooks: independent     SUBJECTIVE  Agreeable to activity.     PHYSICAL THERAPY EXAMINATION    OBJECTIVE  Precautions: None  Fall Risk: Standard fall risk    WEIGHT BEARING RESTRICTION  L Lower Extremity: Weight Bearing as Tolerated    PAIN ASSESSMENT  Ratin    COGNITION  Overall Cognitive Status:  WFL - within functional limits    RANGE OF MOTION AND STRENGTH ASSESSMENT  Upper extremity ROM and strength are within functional limits.  Lower extremity ROM is within functional limits.  Lower extremity strength is within functional limits.    BALANCE  Static Sitting: Good  Dynamic Sitting: Fair +  Static Standing: Fair  Dynamic Standing: Fair -    AM-PAC '6-Clicks' INPATIENT SHORT FORM - BASIC MOBILITY  How much difficulty does the patient currently have...  Patient Difficulty: Turning over in bed (including adjusting bedclothes, sheets and blankets)?: A Little   Patient Difficulty: Sitting down on and standing up from a chair with arms (e.g., wheelchair, bedside commode, etc.): A Little   Patient Difficulty: Moving from lying on back to sitting on the side of the bed?: A Little   How much help from another person does the patient currently need...   Help from Another: Moving to and from a bed to a chair (including a wheelchair)?: A Little   Help from Another: Need to walk in hospital room?: A Little   Help from Another: Climbing 3-5 steps with a railing?: A Little     AM-PAC Score:  Raw Score: 18   Approx Degree of Impairment: 46.58%   Standardized Score (AM-PAC Scale): 43.63   CMS Modifier (G-Code): CK    FUNCTIONAL ABILITY STATUS  Functional Mobility/Gait Assessment  Gait Assistance: Other (Comment) (SBA)  Distance (ft): 200  Assistive Device: Rolling walker  Pattern: Shuffle;L Decreased stance time (slow pace)  Supine to Sit: stand-by assist  Sit to Stand: stand-by assist    Exercise/Education Provided:  Education Provided To: Patient     Patient Education: Role of Physical Therapy;Plan of Care;DME Recommendations;Functional Transfer Techniques;Weight Bear Status;Surgical  Precautions;Posture/Positioning;Energy Conservation;Proper Body Mechanics;Gait Training; benefits of frequent ambulation/oob mobility    Patient's Response to Education: Verbalized Understanding;Returned Demonstration;Requires Further Education     Skilled Therapy Provided: Pt received resting in bed and agreeable to activity. Introduced self and role. Educated on the above. Pt with no c/o pain. Demos bed mobility with SBA. STS transfer to/from bed and chair with SBA; VC given for safe hand placement and body mechanics. Ambulated in hallway with RW and SBA. Slow but steady gait, noted to have narrow LIZBETH with VC to correct. No LOB. Returned to room and was left sitting in chair with needs within reach, handoff to RN complete.     The patient's Approx Degree of Impairment: 46.58% has been calculated based on documentation in the [facility] '6 clicks' Inpatient Basic Mobility Short Form.  Research supports that patients with this level of impairment may benefit from home with  PT.  Final disposition will be made by interdisciplinary medical team.    Patient End of Session: Up in chair;Needs met;Call light within reach;RN aware of session/findings;All patient questions and concerns addressed;Hospital anti-slip socks    CURRENT GOALS  Goals to be met by: 11/24/24  Patient Goal Patient's self-stated goal is: go home   Goal #1 Patient is able to demonstrate supine - sit EOB @ level: modified independent   Goal #1   Current Status    Goal #2 Patient is able to demonstrate transfers Sit to/from Stand at assistance level: modified independent     Goal #2  Current Status    Goal #3 Patient is able to ambulate 200 feet with assistive device at assistance level: supervision    Goal #3   Current Status    Goal #4    Goal #4   Current Status    Goal #5 Patient verbalizes and/or demonstrates all precautions and safety concerns independently   Goal #5   Current Status    Goal #6 Patient independently performs home exercise program for  ROM/strengthening per the instructions provided in preparation for discharge.   Goal #6  Current Status      Patient Evaluation Complexity Level:  History Low - no personal factors and/or co-morbidities   Examination of body systems Low -  addressing 1-2 elements   Clinical Presentation Low- Stable   Clinical Decision Making  Low Complexity     Therapeutic Activity:  25 minutes

## 2024-11-19 NOTE — BRIEF OP NOTE
Pre-Operative Diagnosis: Primary osteoarthritis of left hip [M16.12]     Post-Operative Diagnosis: Primary osteoarthritis of left hip [M16.12]      Procedure Performed:   Left total hip arthroplasty, anterior approach    Surgeons and Role:     * Gideon Aguayo MD - Primary    Assistant(s):  Surgical Assistant.: Hallie Hardwick  PA: Florentino Faustin PA-C     Surgical Findings: OA     Specimen: pa     Estimated Blood Loss: 300ml        GIDEON AGUAYO MD  11/19/2024  10:43 AM

## 2024-11-19 NOTE — CM/SW NOTE
Post op PT/OT evaluations pending at this time.     CM sent tentative home healthcare referrals via [employer].  Orders and face to face entered.     / to remain available for support and/or discharge planning.      Plan: TBD, pending PT/OT    Silvia Moser RN, BSN  Nurse   [phone]

## 2024-11-19 NOTE — ANESTHESIA PREPROCEDURE EVALUATION
Anesthesia PreOp Note    HPI:     Josiah Baker is a 54 year old male who presents for preoperative consultation requested by: Fernandez Aguayo MD    Date of Surgery: 11/19/2024    Procedure(s):  Left total hip arthroplasty, anterior approach  Indication: Primary osteoarthritis of left hip [M16.12]    Relevant Problems   No relevant active problems       NPO:  Last Liquid Consumption Date: 11/18/24  Last Liquid Consumption Time: 2000  Last Solid Consumption Date: 11/18/24  Last Solid Consumption Time: 1800  Last Liquid Consumption Date: 11/18/24          History Review:  Patient Active Problem List    Diagnosis Date Noted    Primary osteoarthritis of left hip 11/11/2024    Observation for suspected condition 09/13/2024    Atrophy of muscle of right lower leg 01/29/2022    Numbness and tingling 01/29/2022    Viral warts 04/05/2017    Tinea corporis 04/05/2017    Forearm laceration 02/03/2017    Laceration of forearm, left 01/31/2017    Elbow injury 02/12/2016    Left foot pain 02/12/2016    Open wound of finger(s) , complicated 06/20/2013    Cellulitis and abscess of hand, except fingers and thumb 06/29/2012    Laboratory examination ordered as part of a routine general medical examination 06/29/2012    Psychosexual dysfunction with inhibited sexual excitement 06/29/2012    Unspecified sinusitis (chronic) 06/29/2012    Chronic sinusitis 06/25/2012    Erectile dysfunction 06/25/2012    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    History of laminectomy    ?    Hx of motion sickness    Knee injury    R ACL/MCL   s/p surgical repair    Laboratory examination ordered as part of a routine general medical examination    Lipid screening    Lipid screening    Per [employer]    PONV (postoperative nausea and vomiting)    Psychosexual dysfunction with inhibited sexual excitement    Screening PSA (prostate specific antigen)    Per [employer]    Visual impairment    glasses       Past Surgical History:   Procedure Laterality Date    Arthroscopy of joint unlisted Left     knee plica removed    Colonoscopy N/A 10/02/2019    Procedure: COLONOSCOPY;  Surgeon: Florentino Saavedra MD;  Location: [facility] ENDO    Exc back leandro deep < 5 cm  09/13/2024    Excision Mass Back    Other surgical history      Nasal polyp removal    Other surgical history      RIGHT ACL/MCL REPAIR       Prescriptions Prior to Admission[1]  Current Medications and Prescriptions Ordered in Epic[2]    Allergies[3]    Family History   Problem Relation Age of Onset    Hypertension Father     Other (Other) Father         DEMENTIA    Seizure Disorder Mother     Anemia Mother     Other (Other) Mother         Lymphoma    Colon Cancer Other         Close relative     Social History     Socioeconomic History    Marital status: Single   Occupational History    Occupation: self employed   Tobacco Use    Smoking status: Never    Smokeless tobacco: Never   Vaping Use    Vaping status: Never Used   Substance and Sexual Activity    Alcohol use: Yes     Alcohol/week: 10.0 standard drinks of alcohol     Types: 10 Standard drinks or equivalent per week     Comment: occasional    Drug use: Yes     Types: Cannabis     Comment: last time: 11/13   Other Topics Concern    Caffeine Concern Yes     Comment: 1-2 cups daily, (coffee, soda)    Exercise Yes     Comment: 3-5 x's a week    Reaction to local anesthetic No       Available pre-op labs reviewed.  Lab Results   Component Value Date    WBC 4.0 10/30/2024    RBC 5.10 10/30/2024    HGB 16.6 10/30/2024    HCT 46.1 10/30/2024    MCV 90.4  10/30/2024    MCH 32.5 10/30/2024    MCHC 36.0 10/30/2024    RDW 12.4 10/30/2024    .0 (L) 10/30/2024     Lab Results   Component Value Date     10/30/2024    K 4.4 10/30/2024     10/30/2024    CO2 29.0 10/30/2024    BUN 18 10/30/2024    CREATSERUM 1.22 10/30/2024    GLU 92 10/30/2024    CA 9.9 10/30/2024          Vital Signs:  Body mass index is 26.54 kg/m².   height is 1.93 m (6' 4\") and weight is 98.9 kg (218 lb). His oral temperature is 97.2 °F (36.2 °C). His blood pressure is 154/84 and his pulse is 79. His respiration is 16 and oxygen saturation is 99%.   Vitals:    11/13/24 1148 11/19/24 0646   BP:  154/84   Pulse:  79   Resp:  16   Temp:  97.2 °F (36.2 °C)   TempSrc:  Oral   SpO2:  99%   Weight: 102.1 kg (225 lb) 98.9 kg (218 lb)   Height: 1.93 m (6' 4\") 1.93 m (6' 4\")        Anesthesia Evaluation     Patient summary reviewed and Nursing notes reviewed    History of anesthetic complications   Airway   Mallampati: II  TM distance: >3 FB  Neck ROM: full  Dental      Pulmonary - normal exam   Cardiovascular - normal exam  Exercise tolerance: good    NYHA Classification: I  ECG reviewed  ROS comment: Narrative  Performed by: MUSE  Sinus bradycardia  Otherwise normal ECG  When compared with ECG of 23-JAN-2023 10:55,  No significant change was found  Confirmed by DWAYNE MA ([id number]) on 10/30/2024 12:40:30 PM  Specimen Collected: 10/30/24 07:36        Neuro/Psych    (+)  neuromuscular disease,        GI/Hepatic/Renal - negative ROS   (-) liver disease, renal disease    Endo/Other    (-) diabetes mellitus, hypothyroidism    Comments:  1. Preop general physical exam     - Comp Metabolic Panel (14); Future  - CBC With Differential With Platelet; Future  - MRSA/SA Scrn by PCR; Future  - XR CHEST PA + LAT CHEST (CPT=71046); Future  - EKG 12 Lead; Future  - Urinalysis with Culture Reflex     2. Bilateral hip joint arthritis  OR scheduled in 28 days     3. Hypertension, unspecified type  Controlled  off meds     4. Elevated serum creatinine  Labs ordered     Patient recalls having nausea and vomiting with nitrous oxide that was given prior to his back surgery.  Will forward that information to orthopedics after testing results are in  Clearance letter to Dr. Fernandez Aguayo     Patient Active Problem List  Diagnosis  · Cellulitis and abscess of hand, except fingers and thumb  · Laboratory examination ordered as part of a routine general medical examination  · Psychosexual dysfunction with inhibited sexual excitement  · Unspecified sinusitis (chronic)  · Chronic sinusitis  · Erectile dysfunction  · Antrochoanal polyp  · Open wound of finger(s) , complicated  · Elbow injury  · Left foot pain  · Laceration of forearm, left  · Forearm laceration  · Viral warts  · Tinea corporis  · Atrophy of muscle of right lower leg  · Numbness and tingling  · Observation for suspected condition      Abdominal  - normal exam                 Anesthesia Plan:   ASA:  2  Plan:   Spinal and MAC  Post-op Pain Management: Intrathecal narcotics  Informed Consent Plan and Risks Discussed With:  Patient  Discussed plan with:  CRNA, attending and surgeon  Provider Attestation (if preop done by other):  SA/MAC poss GA      I have informed Josiah Baker and/or legal guardian or family member of the nature of the anesthetic plan, benefits, risks including possible dental damage if relevant, major complications, and any alternative forms of anesthetic management.   All of the patient's questions were answered to the best of my ability. The patient desires the anesthetic management as planned.  I have informed this Josiah Baker of the risks of neuraxial anesthesia including, but not limited to: failure,headache, backache, spinal, unilateral/patchy block, difficulty breathing, infection, bleeding, itching,nerve damage, paralysis, death. The patient desires the proposed neuraxial anesthetic as planned.    ALISA WASHINGTON MD  11/19/2024 7:13  AM  Present on Admission:  **None**           [1]   Medications Prior to Admission   Medication Sig Dispense Refill Last Dose/Taking    Multiple Vitamin (MULTIVITAMIN OR) Take 1 tablet by mouth daily.   11/10/2024    acetaminophen 500 MG Oral Tab Take 1 tablet (500 mg total) by mouth every 6 (six) hours as needed for Pain.   11/18/2024 Morning    emtricitabine-tenofovir 200-300 MG Oral Tab Take 1 tablet by mouth daily.   11/16/2024   [2]   Current Facility-Administered Medications Ordered in Epic   Medication Dose Route Frequency Provider Last Rate Last Admin    lactated ringers infusion   Intravenous Continuous Fernandez Aguayo MD 20 mL/hr at 11/19/24 0659 New Bag at 11/19/24 0659    vancomycin (Vancocin) 1.5 g in sodium chloride 0.9% 250mL IVPB premix  15 mg/kg Intravenous Once Fernandez Aguayo MD        ceFAZolin (Ancef) 2g in 10mL IV syringe premix  2 g Intravenous Once Fernandez Aguayo MD         No current [facility]-ordered outpatient medications on file.   [3] No Known Allergies

## 2024-11-19 NOTE — CONSULTS
[facility]    PATIENT'S NAME: DWAYNE GUTIERREZ   ATTENDING PHYSICIAN: Fernandez Aguayo MD   CONSULTING PHYSICIAN: Kristin Thompson MD   PATIENT ACCOUNT#:   [id number]    LOCATION:   Room 6 A [facility]  MEDICAL RECORD #:   [id number]       YOB: 1970  ADMISSION DATE:       11/19/2024      CONSULT DATE:  11/19/2024    REPORT OF CONSULTATION      REASON FOR ADMISSION:  Post left total hip arthroplasty.    HISTORY OF PRESENT ILLNESS:  Patient is a 54-year-old  male with chronic left hip pain and underlying severe primary osteoarthritis, failed outpatient conservative medical management options.  Scheduled today for above-mentioned procedure by his orthopedic surgeon, Dr. Aguayo.  Preoperatively, he had spinal block.  Postoperatively, transferred to PACU for further monitoring.    PAST MEDICAL HISTORY:  Generalized osteoarthritis.    PAST SURGICAL HISTORY:  Excision of left back subfascial mass, nasal polypectomy, bilateral knee arthroscopic procedures.    MEDICATIONS:  Please see medication reconciliation list.     ALLERGIES:  No known drug allergies.    SOCIAL HISTORY:  No tobacco or drug use.  Social alcohol.  Lives with his family.  Independent in his basic activities of daily living.     FAMILY HISTORY:  Father had hypertension and dementia.  Mother had seizure disorder.    REVIEW OF SYSTEMS:  Currently resting in bed.  No left hip pain.  No chest pain.  No shortness of breath.  Other 12-point review of systems is negative.       PHYSICAL EXAMINATION:    GENERAL:  Alert and oriented to time, place and person.  No acute distress.   VITAL SIGNS:  Temperature 97.2, pulse 79, respiratory rate 16, blood pressure 154/84, pulse ox 99% on room air.  HEENT:  Atraumatic.  Oropharynx clear.  Moist mucous membranes.  Ears and nose normal.  Eyes:  Anicteric sclerae.   NECK:  Supple.  No lymphadenopathy.  Trachea midline.  Full range of motion.   LUNGS:  Clear to auscultation bilaterally.  Normal  respiratory effort.    HEART:  Regular rate and rhythm.  S1 and S2 auscultated.  No murmur.    ABDOMEN:  Soft, nondistended.  No tenderness.  Positive bowel sounds.   EXTREMITIES:  No peripheral edema, clubbing or cyanosis.  Left anterior hip Prevena wound VAC and Hemovac surgical drain.  NEUROLOGIC:  Decreased sensation and muscle movement in both lower extremities, post spinal block.  No other focal findings.     ASSESSMENT AND PLAN:  Left hip primary osteoarthritis, status post anterior approach left total hip arthroplasty.  Spinal block.  Pain control.  Neurovascular checks.  Monitor surgical wound and drains.  Aspirin for DVT prophylaxis.  Physical and occupational therapy.      Dictated By Kristin Thompson MD  d: 11/19/2024 11:29:50  t: 11/19/2024 12:32:21  Job [id number]  FB/

## 2024-11-19 NOTE — ANESTHESIA POSTPROCEDURE EVALUATION
Patient: Josiah Baker    Procedure Summary       Date: 11/19/24 Room / Location: [facility] MAIN OR [address] [place] [address] MAIN OR    Anesthesia Start: 0826 Anesthesia Stop:     Procedure: Left total hip arthroplasty, anterior approach (Left: Hip) Diagnosis:       Primary osteoarthritis of left hip      (Primary osteoarthritis of left hip [M16.12])    Surgeons: Fernandez Aguayo MD Anesthesiologist: More Pompa MD    Anesthesia Type: spinal, MAC ASA Status: 2            Anesthesia Type: spinal, MAC    Vitals Value Taken Time   /67 11/19/24 1104   Temp 98.1 11/19/24 1106   Pulse 69 11/19/24 1105   Resp 23 11/19/24 1105   SpO2 97 % 11/19/24 1105   Vitals shown include unfiled device data.    [facility] AN Post Evaluation:   Patient Evaluated in PACU  Patient Participation: complete - patient cannot participate  Level of Consciousness: obtunded/minimal responses  Pain Score: 0  Pain Management: adequate  Airway Patency:patent  Dental exam unchanged from preop  Yes    Nausea/Vomiting: none  Cardiovascular Status: acceptable and hemodynamically stable  Respiratory Status: acceptable and face mask  Postoperative Hydration acceptable      Brittney Ramos CRNA  11/19/2024 11:06 AM

## 2024-11-20 ENCOUNTER — APPOINTMENT (OUTPATIENT)
Dept: GENERAL RADIOLOGY | Facility: HOSPITAL | Age: 54
End: 2024-11-20
Attending: ORTHOPAEDIC SURGERY
Payer: COMMERCIAL

## 2024-11-20 VITALS
DIASTOLIC BLOOD PRESSURE: 82 MMHG | TEMPERATURE: 99 F | WEIGHT: 218 LBS | SYSTOLIC BLOOD PRESSURE: 136 MMHG | RESPIRATION RATE: 18 BRPM | HEIGHT: 76 IN | HEART RATE: 82 BPM | BODY MASS INDEX: 26.55 KG/M2 | OXYGEN SATURATION: 100 %

## 2024-11-20 LAB
DEPRECATED RDW RBC AUTO: 41.3 FL (ref 35.1–46.3)
ERYTHROCYTE [DISTWIDTH] IN BLOOD BY AUTOMATED COUNT: 12.1 % (ref 11–15)
HCT VFR BLD AUTO: 34.5 %
HGB BLD-MCNC: 12.7 G/DL
MCH RBC QN AUTO: 34 PG (ref 26–34)
MCHC RBC AUTO-ENTMCNC: 36.8 G/DL (ref 31–37)
MCV RBC AUTO: 92.5 FL
PLATELET # BLD AUTO: 89 10(3)UL (ref 150–450)
PLATELETS.RETICULATED NFR BLD AUTO: 3.3 % (ref 0–7)
RBC # BLD AUTO: 3.73 X10(6)UL
WBC # BLD AUTO: 6.8 X10(3) UL (ref 4–11)

## 2024-11-20 PROCEDURE — 73502 X-RAY EXAM HIP UNI 2-3 VIEWS: CPT | Performed by: ORTHOPAEDIC SURGERY

## 2024-11-20 RX ORDER — ASPIRIN 325 MG
325 TABLET, DELAYED RELEASE (ENTERIC COATED) ORAL 2 TIMES DAILY
Qty: 60 TABLET | Refills: 0 | Status: SHIPPED | OUTPATIENT
Start: 2024-11-20

## 2024-11-20 RX ORDER — PSEUDOEPHEDRINE HCL 30 MG
100 TABLET ORAL 2 TIMES DAILY
Qty: 20 CAPSULE | Refills: 0 | Status: SHIPPED | OUTPATIENT
Start: 2024-11-20

## 2024-11-20 RX ORDER — FERROUS SULFATE 325(65) MG
325 TABLET, DELAYED RELEASE (ENTERIC COATED) ORAL
Qty: 20 TABLET | Refills: 0 | Status: SHIPPED | OUTPATIENT
Start: 2024-11-21

## 2024-11-20 RX ORDER — HYDROCODONE BITARTRATE AND ACETAMINOPHEN 7.5; 325 MG/1; MG/1
1 TABLET ORAL EVERY 6 HOURS PRN
Qty: 40 TABLET | Refills: 0 | Status: SHIPPED | OUTPATIENT
Start: 2024-11-20

## 2024-11-20 NOTE — ANESTHESIA POST-OP FOLLOW-UP NOTE
[facility]   Acute Pain Rounds Note  2024    Patient name: Josiah Baker 54 year old male  : 1970  MRN: [id number]    Diagnosis:   1. Preoperative testing    2. Primary osteoarthritis of left hip        S/P: JANY    Pain modality: Duramorph    Current hospital day: Hospital Day: 2    Pain Scores: 2    Current Medications:  Scheduled Meds:   sennosides  17.2 mg Oral Nightly    docusate sodium  100 mg Oral BID    aspirin  325 mg Oral BID    famotidine  20 mg Oral BID    Or    famotidine  20 mg Intravenous BID    ferrous sulfate  325 mg Oral Daily with breakfast     Continuous Infusions:   lactated ringers Stopped (24 1053)    sodium chloride 125 mL/hr at 24 1744     PRN Meds:.  naloxone    acetaminophen    HYDROcodone-acetaminophen    HYDROcodone-acetaminophen    HYDROmorphone    HYDROmorphone    diphenhydrAMINE **OR** diphenhydrAMINE    nalbuphine    sodium chloride    polyethylene glycol (PEG 3350)    magnesium hydroxide    bisacodyl    fleet enema    ondansetron    prochlorperazine    diphenhydrAMINE **OR** diphenhydrAMINE    HYDROmorphone **OR** HYDROmorphone    HYDROcodone-acetaminophen    Assessment:  Doing well no complications   Some itching  Plan:  Po pain meds    DILLAN ROGEL MD  Anesthesia Acute Pain Service [phone]

## 2024-11-20 NOTE — OCCUPATIONAL THERAPY NOTE
OCCUPATIONAL THERAPY EVALUATION - INPATIENT     Room Number: Room 6/Room 6-A  Evaluation Date: 2024  Type of Evaluation: Initial  Presenting Problem: L JANY;  anterior approach    Physician Order: IP Consult to Occupational Therapy  Reason for Therapy: ADL/IADL Dysfunction and Discharge Planning    OCCUPATIONAL THERAPY ASSESSMENT   Patient is a 54 year old male admitted 2024 for L JANY; anterior approach; WBAT.  Prior to admission, patient's baseline is independent.  Patient is currently functioning at--to close to baseline with all self care and functional mobiltiy; pt has no unmet OT needs at the acute care level; if change in function occurs, please re-order.    PLAN  Patient has been evaluated and presents with no skilled Occupational Therapy needs  at this time.  Patient will be discharged from Occupational Therapy services. Please re-order if a new functional limitation presents during this admission.    OT Device Recommendations: TBD    OCCUPATIONAL THERAPY MEDICAL/SOCIAL HISTORY   Problem List  Principal Problem:    Primary osteoarthritis of left hip    HOME SITUATION  Type of Home: House  Home Layout: One level  Lives With: Alone  Drives: Yes  Patient Regularly Uses: Glasses  SUBJECTIVE  Patient cooperative and agreeable to participate    OCCUPATIONAL THERAPY EXAMINATION    OBJECTIVE  Precautions: None  Fall Risk: Standard fall risk    WEIGHT BEARING RESTRICTION  L Lower Extremity: Weight Bearing as Tolerated    PAIN ASSESSMENT  Ratin    Communication: WNL    Behavioral/Emotional/Social: Cooperative     RANGE OF MOTION   Upper extremity ROM is within functional limits     STRENGTH ASSESSMENT  Upper extremity strength is within functional limits     COORDINATION  Gross Motor: WFL   Fine Motor: WFL     ACTIVITIES OF DAILY LIVING ASSESSMENT  AM-PAC ‘6-Clicks’ Inpatient Daily Activity Short Form  How much help from another person does the patient currently need…  -   Putting on and taking off  regular lower body clothing?: A Little  -   Bathing (including washing, rinsing, drying)?: A Little  -   Toileting, which includes using toilet, bedpan or urinal? : A Little  -   Putting on and taking off regular upper body clothing?: None  -   Taking care of personal grooming such as brushing teeth?: None  -   Eating meals?: None    AM-PAC Score:  Score: 21  Approx Degree of Impairment: 32.79%  Standardized Score (AM-PAC Scale): 44.27  CMS Modifier (G-Code): CJ    FUNCTIONAL TRANSFER ASSESSMENT  Sit to Stand: Chair  Chair: Supervision  Toilet Transfer: Supervision    BED MOBILITY     BALANCE ASSESSMENT  Static Sitting: Supervision  Static Standing: Supervision    FUNCTIONAL ADL ASSESSMENT  Eating: Independent  Grooming Seated: Independent  Bathing Seated: Supervision  UB Dressing Seated: Supervision  LB Dressing Seated: Supervision  Toileting Seated: Supervision    EDUCATION PROVIDED  Patient Education : Role of Occupational Therapy; Plan of Care; Discharge Recommendations; Functional Transfer Techniques; Fall Prevention; Weight Bear Status; Surgical Precautions; Posture/Positioning  Patient's Response to Education: Verbalized Understanding    The patient's Approx Degree of Impairment: 32.79% has been calculated based on documentation in the [facility] '6 clicks' Inpatient Daily Activity Short Form.  Research supports that patients with this level of impairment may benefit from home with support.  Final disposition will be made by interdisciplinary medical team.    Patient End of Session: Up in chair;Needs met;Call light within reach;All patient questions and concerns addressed;RN aware of session/findings    Patient Evaluation Complexity Level:   Occupational Profile/Medical History LOW - Brief history including review of medical or therapy records    Specific performance deficits impacting engagement in ADL/IADL LOW  1 - 3 performance deficits    Client Assessment/Performance Deficits LOW - No comorbidities nor  modifications of tasks    Clinical Decision Making LOW - Analysis of occupational profile, problem-focused assessments, limited treatment options    Overall Complexity LOW     OT Session Time: 15 minutes  Self-Care Home Management: 10 minutes  Therapeutic Activity: 5 minutes    Rudolph Thompson, Occupational Therapist, OTR/L ext [phone]

## 2024-11-20 NOTE — DISCHARGE INSTRUCTIONS
Home care nurse to remove wound vac dressing on Tuesday.  Apply Mepilex dressing.  Change dressing every 3-5 days.  Keep wound dry for 2 weeks.  Ambulate with walker and assist    Sometimes managing your health at home requires assistance.  The [facility] team has recognized your preference to use [facility]. A representative from the home health agency will contact you or your family to schedule your first visit.      [address], [address]  [place] [address]  Phone: [phone]

## 2024-11-20 NOTE — CM/SW NOTE
11/20/24 0800   Choice of Post-Acute Provider   Informed patient of right to choose their preferred provider Yes   List of appropriate post-acute services provided to patient/family with quality data No - Declined list   Patient/family choice [facility]   Information given to Patient     CM spoke with patient and confirmed he is agreeable to home healthcare arrangement and choice agency is [facility] ([facility] unable to accommodate d/t staff shortage).  CM reserved [facility] via [facility] and notified liaison of choice. Patient discharge instructions updated with [facility] contact information.     1324 MDO placed for discharge.  CM notified [facility] liaison Deepa of patient discharge today.  [facility] will be reaching out to patient to coordinate start of care.    Patient cleared for discharge from CM/ standpoint.    Plan: Home with [facility]    Silvia Moser RN, BSN  Nurse   [phone]

## 2024-11-20 NOTE — PHYSICAL THERAPY NOTE
PHYSICAL THERAPY TREATMENT NOTE - INPATIENT     Room Number: Room 6/Room 6-A       Presenting Problem: s/p L JANY, anterior approach on        Problem List  Principal Problem:    Primary osteoarthritis of left hip      PHYSICAL THERAPY ASSESSMENT   Patient demonstrates good  progress this session, goals  progressing with transfers, ambulation this session.     Patient is requiring supervision as a result of the following impairments: decreased functional strength, decreased muscular endurance, and limited LLE ROM.     Patient continues to function below baseline with bed mobility, transfers, gait, and stair negotiation. Pt demos adequate safety and stability with functional mobility tasks and is safe to discharge from PT standpoint.  Physical Therapy will continue to follow patient for duration of hospitalization.    Patient continues to benefit from continued skilled PT services: at discharge to promote prior level of function and safety with additional support and return home with home health PT.    PLAN DURING HOSPITALIZATION  Nursing Mobility Recommendation : 1 Assist  PT Device Recommendation: Rolling walker  PT Treatment Plan: Bed mobility;Body mechanics;Endurance;Energy conservation;Patient education;Gait training;Strengthening;Transfer training;Balance training;Stoop training  Frequency (Obs): Daily     SUBJECTIVE  Agreeable to activity    OBJECTIVE  Precautions: None    WEIGHT BEARING RESTRICTION  L Lower Extremity: Weight Bearing as Tolerated      PAIN ASSESSMENT   Ratin  Location: denies       BALANCE  Static Sitting: Normal  Dynamic Sitting: Good  Static Standing: Fair +  Dynamic Standing: Fair    ACTIVITY TOLERANCE  Pulse: 91  Heart Rate Source: Monitor     BP: 136/82  BP Location: Left arm  BP Method: Automatic  Patient Position: Sitting     O2 WALK  Oxygen Therapy  SPO2% on Room Air at Rest: 99    AM-PAC '6-Clicks' INPATIENT SHORT FORM - BASIC MOBILITY  How much difficulty does the patient  currently have...  Patient Difficulty: Turning over in bed (including adjusting bedclothes, sheets and blankets)?: A Little   Patient Difficulty: Sitting down on and standing up from a chair with arms (e.g., wheelchair, bedside commode, etc.): A Little   Patient Difficulty: Moving from lying on back to sitting on the side of the bed?: A Little   How much help from another person does the patient currently need...   Help from Another: Moving to and from a bed to a chair (including a wheelchair)?: A Little   Help from Another: Need to walk in hospital room?: A Little   Help from Another: Climbing 3-5 steps with a railing?: A Little     AM-PAC Score:  Raw Score: 18   Approx Degree of Impairment: 46.58%   Standardized Score (AM-PAC Scale): 43.63   CMS Modifier (G-Code): CK    FUNCTIONAL ABILITY STATUS  Functional Mobility/Gait Assessment  Gait Assistance: Supervision  Distance (ft): 150 ft  Assistive Device: Rolling walker  Pattern: L Decreased stance time (slow pace, decreased step length)  Sit to Stand: supervision to RW    Skilled Therapy Provided: Pt rec'd in chair agreeable to therapy, identified by name and , gait belt donned for mobility. Session focused on progressing mobility in preparation for discharge. Pt able to perform all mobility with supervision. Pt ed provided on importance of OOB mobility once home, use of ice, use of RW, HEP therex, and car transfers, all with good pt understanding.         The patient's Approx Degree of Impairment: 46.58% has been calculated based on documentation in the [facility] '6 clicks' Inpatient Daily Activity Short Form.  Research supports that patients with this level of impairment may benefit from HHPT.  Final disposition will be made by interdisciplinary medical team.    THERAPEUTIC EXERCISES  Lower Extremity Ankle pumps  Glut sets  Hip AB/AD  LAQ  Quad sets     Position Sitting       Patient End of Session: Up in chair;Needs met;Call light within reach;RN aware of  session/findings    CURRENT GOALS   Goals to be met by: 24  Patient Goal Patient's self-stated goal is: go home   Goal #1 Patient is able to demonstrate supine - sit EOB @ level: modified independent   Goal #1   Current Status  in progress   Goal #2 Patient is able to demonstrate transfers Sit to/from Stand at assistance level: modified independent      Goal #2  Current Status  in progress   Goal #3 Patient is able to ambulate 200 feet with assistive device at assistance level: supervision    Goal #3   Current Status  in progress   Goal #4     Goal #4   Current Status     Goal #5 Patient verbalizes and/or demonstrates all precautions and safety concerns independently   Goal #5   Current Status  in progress   Goal #6 Patient independently performs home exercise program for ROM/strengthening per the instructions provided in preparation for discharge.   Goal #6  Current Status  in progress     Gait Trainin minutes  Therapeutic Activity: 15 minutes

## 2024-11-21 ENCOUNTER — TELEPHONE (OUTPATIENT)
Dept: ORTHOPEDICS CLINIC | Facility: CLINIC | Age: 54
End: 2024-11-21

## 2024-11-22 NOTE — TELEPHONE ENCOUNTER
Post-op call for Dr. Aguayo    Date: 11/21/2024      Time: 7:03 PM   Patient: Josiah YUNG number: [id number]   Surgery and surgery date:11/19/2024    Procedure Laterality Anesthesia   Left total hip arthroplasty, anterior approach       Patient unavailable.  Left message on voicemail to call clinic with questions or concerns.  Number was provided./ SPOKE TO PATIENT  Patient's general feeling since discharge:/ DOING GOOD  Pain control (0-10):/5  STIFFNESS/  Fever:  no  Chills:  no  SOB: no  Incision site appearance:  Redness  no  Drainage no  Clean/dry/Intact yes  Calf pain, redness or warmth:  no   Bowel Regimen: yes LBM  11/21/2024  If not, what are you taking stool softener/laxative?/ Advised with stool softener,fresh fiber fruit with peeling, and miralax  Confirmed appointment date for post op:12/18/2024  Other concerns patients may ask: nothing specific. We discussed ice placement and protocol  Bathing and bandages   Patient needs to keep incision clean and dry for the first week./DONE  Enforce rest, ice, compression elevation and use their pain medication accordingly.DONE  If the patient needs more pain medication, please put in a communication.

## 2024-12-18 ENCOUNTER — OFFICE VISIT (OUTPATIENT)
Dept: ORTHOPEDICS CLINIC | Facility: CLINIC | Age: 54
End: 2024-12-18
Payer: COMMERCIAL

## 2024-12-18 ENCOUNTER — HOSPITAL ENCOUNTER (OUTPATIENT)
Dept: GENERAL RADIOLOGY | Facility: HOSPITAL | Age: 54
Discharge: HOME | End: 2024-12-18
Attending: PHYSICIAN ASSISTANT
Payer: COMMERCIAL

## 2024-12-18 DIAGNOSIS — Z47.89 ORTHOPEDIC AFTERCARE: Primary | ICD-10-CM

## 2024-12-18 DIAGNOSIS — Z47.89 ORTHOPEDIC AFTERCARE: ICD-10-CM

## 2024-12-18 PROCEDURE — 99024 POSTOP FOLLOW-UP VISIT: CPT | Performed by: PHYSICIAN ASSISTANT

## 2024-12-18 PROCEDURE — 73502 X-RAY EXAM HIP UNI 2-3 VIEWS: CPT | Performed by: PHYSICIAN ASSISTANT

## 2024-12-18 RX ORDER — EMTRICITABINE AND TENOFOVIR DISOPROXIL FUMARATE 200; 300 MG/1; MG/1
1 TABLET, FILM COATED ORAL DAILY
COMMUNITY
Start: 2024-12-10

## 2024-12-18 NOTE — PROGRESS NOTES
NURSING INTAKE COMMENTS:   Chief Complaint   Patient presents with    Post-Op     1st POV S/p L JANY sx 11/19/24 rates pain 1/10 doing well        HPI: This 54 year old male presents today for follow-up on his left hip.  He is almost 4 weeks status post left total hip arthroplasty through an anterior approach.  He is doing quite well.  He had a few visits of home therapy.  He has been doing exercises on his own and is pleased with his results.  He is not using any assistive devices.  He is not taking any medications.    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    History of laminectomy    ?    Hx of motion sickness    Knee injury    R ACL/MCL  s/p surgical repair    Laboratory examination ordered as part of a routine general medical examination    Lipid screening    Lipid screening    Per [employer]    PONV (postoperative nausea and vomiting)    Psychosexual dysfunction with inhibited sexual excitement    Screening PSA (prostate specific antigen)    Per [employer]    Visual impairment    glasses     Past Surgical History:   Procedure Laterality Date    Arthroscopy of joint unlisted Left     knee plica removed    Colonoscopy N/A 10/02/2019    Procedure: COLONOSCOPY;  Surgeon: Florentino Saavedra MD;  Location: [facility]    Exc back leandro deep < 5 cm  09/13/2024    Excision Mass Back    Other surgical history      Nasal polyp removal    Other surgical history      RIGHT ACL/MCL REPAIR     Current Outpatient Medications   Medication Sig Dispense Refill    emtricitabine-tenofovir 200-300 MG Oral Tab Take 1 tablet by mouth daily.      aspirin 325 MG Oral Tab EC  Take 1 tablet (325 mg total) by mouth in the morning and 1 tablet (325 mg total) before bedtime. 60 tablet 0    docusate sodium 100 MG Oral Cap Take 100 mg by mouth 2 (two) times daily. 20 capsule 0    ferrous sulfate 325 (65 FE) MG Oral Tab EC Take 1 tablet (325 mg total) by mouth daily with breakfast. 20 tablet 0    Multiple Vitamin (MULTIVITAMIN OR) Take 1 tablet by mouth daily.      acetaminophen 500 MG Oral Tab Take 1 tablet (500 mg total) by mouth every 6 (six) hours as needed for Pain.      HYDROcodone-acetaminophen 7.5-325 MG Oral Tab Take 1 tablet by mouth every 6 (six) hours as needed. (Patient not taking: Reported on 12/18/2024) 40 tablet 0     Allergies[1]  Family History   Problem Relation Age of Onset    Hypertension Father     Other (Other) Father         DEMENTIA    Seizure Disorder Mother     Anemia Mother     Other (Other) Mother         Lymphoma    Colon Cancer Other         Close relative       Social History     Occupational History    Occupation: self employed   Tobacco Use    Smoking status: Never    Smokeless tobacco: Never   Vaping Use    Vaping status: Never Used   Substance and Sexual Activity    Alcohol use: Yes     Alcohol/week: 10.0 standard drinks of alcohol     Types: 10 Standard drinks or equivalent per week     Comment: occasional    Drug use: Yes     Types: Cannabis     Comment: last time: 11/13    Sexual activity: Not on file        Review of Systems:  GENERAL: feels generally well, no significant weight loss or weight gain  SKIN: no ulcerated or worrisome skin lesions  EYES:denies blurred vision or double vision  HEENT: denies new nasal congestion, sinus pain or ST  LUNGS: denies shortness of breath  CARDIOVASCULAR: denies chest pain  GI: no hematemesis, no worsening heartburn, no diarrhea  : no dysuria, no blood in urine, no difficulty urinating, no incontinence  MUSCULOSKELETAL: no other musculoskeletal complaints other than in HPI  NEURO: no numbness or tingling, no weakness  or balance disorder  PSYCHE: no depression or anxiety  HEMATOLOGIC: no hx of blood dyscrasia  ENDOCRINE: no thyroid or diabetes issues  ALL/ASTHMA: no new hx of severe allergy or asthma    Physical Examination:    There were no vitals taken for this visit.  Constitutional: appears well hydrated, alert and responsive, no acute distress noted  Extremities: The incision is healing well.  No significant redness or swelling.  He may have had some irritation from the skin adhesive.  He was able to get up and ambulate without any difficulty.  Good range of motion of the hip.    Imaging: XR HIP W OR WO PELVIS 2 OR 3 VIEWS, LEFT (CPT=73502)    Result Date: 11/20/2024  PROCEDURE: XR HIP W OR WO PELVIS 2 OR 3 VIEWS, LEFT (CPT=73502)  COMPARISON: [facility], XR HIP W OR WO PELVIS 1 VIEW, LEFT (CPT=73501), 11/19/2024, 11:10 AM.  INDICATIONS: Left anterior hip, over gauze site, possible plastic end piece of drain retained.  TECHNIQUE: 3 views were obtained.   FINDINGS:  BONES: Total left hip arthroplasty in satisfactory alignment and position.  Mild-to-moderate superior joint space narrowing of the right hip with bony spurring at the inferior articular surface of the right femoral head. SOFT TISSUES: Moderate postoperative soft tissue air about the right hip.  No well-defined retained drain piece noted. EFFUSION: None visible. OTHER: Negative.         CONCLUSION:  1. Total left hip arthroplasty.  Moderate postoperative soft tissue air about the right hip.  No well-defined retained drain piece noted.    Dictated by (CST): Magnus Cortes MD on 11/20/2024 at 12:16 PM     Finalized by (CST): Magnus Cortes MD on 11/20/2024 at 12:17 PM          XR FLUOROSCOPY C-ARM TIME LESS THAN 1 HOUR (CPT=76000)    Result Date: 11/19/2024  PROCEDURE: XR FLUORO PHYSICIAN TIME< 1 HOUR (CPT=76000)  COMPARISON: None.  INDICATIONS: Left total hip arthroplasty, anterior approach under fluoro.  TECHNIQUE:   FLUOROSCOPY IMAGES OBTAINED:  1  FLUOROSCOPY TIME:  29 seconds RADIATION DOSE (Dose Area Product):  0.38836-zIv*m^2  FINDINGS: Intraoperative fluoroscopy was utilized. No radiologist was present for this procedure.          CONCLUSION: Intraoperative fluoroscopy provided.  Please see surgical note for specific details.     Dictated by (CST): Sherwin West MD on 11/19/2024 at 12:35 PM     Finalized by (CST): Sherwin West MD on 11/19/2024 at 12:35 PM          XR HIP W OR WO PELVIS 1 VIEW, LEFT (CPT=73501)    Result Date: 11/19/2024  PROCEDURE: XR HIP W OR WO PELVIS 1 VIEW, LEFT (CPT=73501)  COMPARISON: None.  INDICATIONS: Status post left total hip arthroplasty.  TECHNIQUE: Two views Impression:  Status post left JANY.  Normal alignment.  No displaced fracture.  Drain in place. Finalized by (CST): Kev Horowitz MD on 11/19/2024 at 11:47 AM             Lab Results   Component Value Date    WBC 6.8 11/20/2024    HGB 12.7 (L) 11/20/2024    PLT 89.0 (L) 11/20/2024      Lab Results   Component Value Date    GLU 92 10/30/2024    BUN 18 10/30/2024    CREATSERUM 1.22 10/30/2024    GFRNAA 62 11/02/2021    GFRAA 72 11/02/2021        Assessment and Plan:  Diagnoses and all orders for this visit:    Orthopedic aftercare  -     XR HIP W OR WO PELVIS 2 OR 3 VIEWS, LEFT (CPT=73502) [[id number]] - Orthopedic providers only; Future        Assessment: Status post left total hip arthroplasty through an anterior approach    Plan: Josiah is doing well 4 weeks following his surgery.  He is not interested in doing outpatient therapy and wants to continue with his home exercise program and exercising at his health club.  Given his current status I think this is acceptable.  We discussed the expected recovery time following surgery.  Will see him back in May for 6-month postop visit with a new x-ray.  I advised him to call if any questions or problems arise in the meantime.    The above note was creating using Dragon speech recognition technology. Please excuse any  tuyet.    This visit was performed under the supervision of Dr. Fernandez Aguayo who formulated the treatment plan and decision making.        [1] No Known Allergies

## 2025-05-05 RX ORDER — METHYLPREDNISOLONE 4 MG/1
TABLET ORAL
Qty: 1 EACH | Refills: 0 | Status: SHIPPED | OUTPATIENT
Start: 2025-05-05

## 2025-05-15 ENCOUNTER — OFFICE VISIT (OUTPATIENT)
Dept: FAMILY MEDICINE CLINIC | Facility: CLINIC | Age: 55
End: 2025-05-15
Payer: COMMERCIAL

## 2025-05-15 VITALS
DIASTOLIC BLOOD PRESSURE: 81 MMHG | WEIGHT: 222 LBS | SYSTOLIC BLOOD PRESSURE: 137 MMHG | HEART RATE: 67 BPM | BODY MASS INDEX: 27 KG/M2

## 2025-05-15 DIAGNOSIS — H00.012 HORDEOLUM OF RIGHT LOWER EYELID, UNSPECIFIED HORDEOLUM TYPE: Primary | ICD-10-CM

## 2025-05-15 PROCEDURE — 99213 OFFICE O/P EST LOW 20 MIN: CPT | Performed by: FAMILY MEDICINE

## 2025-05-15 PROCEDURE — 3079F DIAST BP 80-89 MM HG: CPT | Performed by: FAMILY MEDICINE

## 2025-05-15 PROCEDURE — 3075F SYST BP GE 130 - 139MM HG: CPT | Performed by: FAMILY MEDICINE

## 2025-05-15 RX ORDER — AZITHROMYCIN 250 MG/1
TABLET, FILM COATED ORAL
Qty: 6 TABLET | Refills: 0 | Status: SHIPPED | OUTPATIENT
Start: 2025-05-15 | End: 2025-05-20

## 2025-05-15 NOTE — PROGRESS NOTES
History of Present Illness  Tiago Baker is a 55 year old male who presents with swelling and redness of the lower eyelid.    He experienced swelling and redness of the lower eyelid after hitting his eye on a door or socket over the weekend. By Wednesday night, swelling was noticeable underneath the eye. He has been using a cold washcloth, which temporarily reduces the swelling. He wears contact lenses and played golf four times over the weekend at a course with fine, powdery sand, which blew into his face. Soreness in the eye started around Sunday, and symptoms have persisted for about four days. No symptoms on the upper eyelid.    Physical Exam  HEENT: O.D.  At least one sty, possibly two, on the lower eyelid. No styes on the upper eyelid.    Assessment & Plan  Stye  Stye in the lower eyelid, possibly two, with atypical presentation. Symptoms include swelling and redness, likely due to blocked gland and possible infection. He wears contact lenses and was exposed to fine sand, which may have contributed to the condition. Symptoms began approximately four days ago.  - Prescribe Z-Kevin (Azithromycin) to address potential infection.  - Advise warm compresses as often as tolerable to help open the blocked gland.  - If symptoms do not improve, refer to an ophthalmologist for further evaluation.    Severe deterioration of right hip  Severe deterioration of the right hip on X-ray. He reports good recovery from previous hip surgery but now experiences issues with the right hip. Plans for hip replacement surgery in September.  - Plan for hip replacement surgery in September.    Recording duration: 14 minutes

## 2025-08-12 ENCOUNTER — OFFICE VISIT (OUTPATIENT)
Dept: FAMILY MEDICINE CLINIC | Facility: CLINIC | Age: 55
End: 2025-08-12

## 2025-08-12 VITALS
WEIGHT: 223.81 LBS | SYSTOLIC BLOOD PRESSURE: 121 MMHG | HEIGHT: 76 IN | DIASTOLIC BLOOD PRESSURE: 76 MMHG | HEART RATE: 67 BPM | BODY MASS INDEX: 27.25 KG/M2

## 2025-08-12 DIAGNOSIS — M16.11 ARTHRITIS OF RIGHT HIP: ICD-10-CM

## 2025-08-12 DIAGNOSIS — Z01.818 PREOP GENERAL PHYSICAL EXAM: Primary | ICD-10-CM

## 2025-08-12 DIAGNOSIS — I10 HYPERTENSION, UNSPECIFIED TYPE: ICD-10-CM

## 2025-08-12 DIAGNOSIS — M48.061 NEUROFORAMINAL STENOSIS OF LUMBAR SPINE: ICD-10-CM

## 2025-08-12 DIAGNOSIS — M62.561 RIGHT CALF ATROPHY: ICD-10-CM

## 2025-08-12 DIAGNOSIS — R79.89 ELEVATED SERUM CREATININE: ICD-10-CM

## 2025-08-12 PROCEDURE — 3074F SYST BP LT 130 MM HG: CPT | Performed by: FAMILY MEDICINE

## 2025-08-12 PROCEDURE — 3008F BODY MASS INDEX DOCD: CPT | Performed by: FAMILY MEDICINE

## 2025-08-12 PROCEDURE — 3078F DIAST BP <80 MM HG: CPT | Performed by: FAMILY MEDICINE

## 2025-08-12 PROCEDURE — 99213 OFFICE O/P EST LOW 20 MIN: CPT | Performed by: FAMILY MEDICINE

## 2025-08-13 ENCOUNTER — LAB ENCOUNTER (OUTPATIENT)
Dept: LAB | Facility: HOSPITAL | Age: 55
End: 2025-08-13
Attending: FAMILY MEDICINE

## 2025-08-13 ENCOUNTER — HOSPITAL ENCOUNTER (OUTPATIENT)
Dept: GENERAL RADIOLOGY | Facility: HOSPITAL | Age: 55
Discharge: HOME | End: 2025-08-13
Attending: FAMILY MEDICINE

## 2025-08-13 ENCOUNTER — EKG ENCOUNTER (OUTPATIENT)
Dept: LAB | Facility: HOSPITAL | Age: 55
End: 2025-08-13
Attending: FAMILY MEDICINE

## 2025-08-13 DIAGNOSIS — M16.11 ARTHRITIS OF RIGHT HIP: ICD-10-CM

## 2025-08-13 DIAGNOSIS — I10 HYPERTENSION, UNSPECIFIED TYPE: ICD-10-CM

## 2025-08-13 DIAGNOSIS — Z01.818 PREOP GENERAL PHYSICAL EXAM: ICD-10-CM

## 2025-08-13 LAB
APTT PPP: 30.2 SECONDS (ref 23–36)
ATRIAL RATE: 60 BPM
BASOPHILS # BLD AUTO: 0.02 X10(3) UL (ref 0–0.2)
BASOPHILS NFR BLD AUTO: 0.4 %
BILIRUB UR QL: NEGATIVE
CLARITY UR: CLEAR
COLOR UR: YELLOW
DEPRECATED RDW RBC AUTO: 40.7 FL (ref 35.1–46.3)
EOSINOPHIL # BLD AUTO: 0.17 X10(3) UL (ref 0–0.7)
EOSINOPHIL NFR BLD AUTO: 3.8 %
ERYTHROCYTE [DISTWIDTH] IN BLOOD BY AUTOMATED COUNT: 12.2 % (ref 11–15)
GLUCOSE UR-MCNC: NORMAL MG/DL
HCT VFR BLD AUTO: 44.3 % (ref 39–53)
HGB BLD-MCNC: 15.8 G/DL (ref 13–17.5)
HGB UR QL STRIP.AUTO: NEGATIVE
IMM GRANULOCYTES # BLD AUTO: 0.01 X10(3) UL (ref 0–1)
IMM GRANULOCYTES NFR BLD: 0.2 %
INR BLD: 0.93 (ref 0.8–1.2)
KETONES UR-MCNC: NEGATIVE MG/DL
LEUKOCYTE ESTERASE UR QL STRIP.AUTO: NEGATIVE
LYMPHOCYTES # BLD AUTO: 1.16 X10(3) UL (ref 1–4)
LYMPHOCYTES NFR BLD AUTO: 26 %
MCH RBC QN AUTO: 32.6 PG (ref 26–34)
MCHC RBC AUTO-ENTMCNC: 35.7 G/DL (ref 31–37)
MCV RBC AUTO: 91.3 FL (ref 80–100)
MONOCYTES # BLD AUTO: 0.47 X10(3) UL (ref 0.1–1)
MONOCYTES NFR BLD AUTO: 10.5 %
MRSA NASAL: NEGATIVE
NEUTROPHILS # BLD AUTO: 2.63 X10 (3) UL (ref 1.5–7.7)
NEUTROPHILS # BLD AUTO: 2.63 X10(3) UL (ref 1.5–7.7)
NEUTROPHILS NFR BLD AUTO: 59.1 %
NITRITE UR QL STRIP.AUTO: NEGATIVE
P AXIS: 82 DEGREES
P-R INTERVAL: 208 MS
PH UR: 6 (ref 5–8)
PLATELET # BLD AUTO: 124 10(3)UL (ref 150–450)
PLATELETS.RETICULATED NFR BLD AUTO: 2.1 % (ref 0–7)
PROT UR-MCNC: NEGATIVE MG/DL
PROTHROMBIN TIME: 13 SECONDS (ref 11.6–14.8)
Q-T INTERVAL: 402 MS
QRS DURATION: 92 MS
QTC CALCULATION (BEZET): 402 MS
R AXIS: 42 DEGREES
RBC # BLD AUTO: 4.85 X10(6)UL (ref 4.3–5.7)
SP GR UR STRIP: 1.03 (ref 1–1.03)
STAPH A BY PCR: NEGATIVE
T AXIS: 44 DEGREES
UROBILINOGEN UR STRIP-ACNC: NORMAL
VENTRICULAR RATE: 60 BPM
WBC # BLD AUTO: 4.5 X10(3) UL (ref 4–11)

## 2025-08-13 PROCEDURE — 71046 X-RAY EXAM CHEST 2 VIEWS: CPT | Performed by: FAMILY MEDICINE

## 2025-08-13 PROCEDURE — 85025 COMPLETE CBC W/AUTO DIFF WBC: CPT

## 2025-08-13 PROCEDURE — 87640 STAPH A DNA AMP PROBE: CPT

## 2025-08-13 PROCEDURE — 85610 PROTHROMBIN TIME: CPT

## 2025-08-13 PROCEDURE — 36415 COLL VENOUS BLD VENIPUNCTURE: CPT

## 2025-08-13 PROCEDURE — 93010 ELECTROCARDIOGRAM REPORT: CPT | Performed by: INTERNAL MEDICINE

## 2025-08-13 PROCEDURE — 85730 THROMBOPLASTIN TIME PARTIAL: CPT

## 2025-08-13 PROCEDURE — 93005 ELECTROCARDIOGRAM TRACING: CPT

## 2025-08-13 PROCEDURE — 87641 MR-STAPH DNA AMP PROBE: CPT

## 2025-08-13 PROCEDURE — 87389 HIV-1 AG W/HIV-1&-2 AB AG IA: CPT

## 2025-08-13 PROCEDURE — 81003 URINALYSIS AUTO W/O SCOPE: CPT | Performed by: FAMILY MEDICINE

## 2025-08-18 ENCOUNTER — RESULTS FOLLOW-UP (OUTPATIENT)
Dept: FAMILY MEDICINE CLINIC | Facility: CLINIC | Age: 55
End: 2025-08-18

## 2025-08-18 ENCOUNTER — HOSPITAL ENCOUNTER (OUTPATIENT)
Dept: ULTRASOUND IMAGING | Facility: HOSPITAL | Age: 55
Discharge: HOME | End: 2025-08-18
Attending: FAMILY MEDICINE

## 2025-08-18 DIAGNOSIS — M62.561 RIGHT CALF ATROPHY: ICD-10-CM

## 2025-08-18 PROCEDURE — 93922 UPR/L XTREMITY ART 2 LEVELS: CPT | Performed by: FAMILY MEDICINE

## 2025-08-19 ENCOUNTER — TELEPHONE (OUTPATIENT)
Dept: FAMILY MEDICINE CLINIC | Facility: CLINIC | Age: 55
End: 2025-08-19

## (undated) DEVICE — MEDI-VAC NON-CONDUCTIVE SUCTION TUBING 6MM X 1.8M (6FT.) L: Brand: CARDINAL HEALTH

## (undated) DEVICE — ADHESIVE LIQ 2/3ML VI MASTISOL

## (undated) DEVICE — 2T11 #2 PDO 36 X 36: Brand: 2T11 #2 PDO 36 X 36

## (undated) DEVICE — GAMMEX® PI HYBRID SIZE 8.5, STERILE POWDER-FREE SURGICAL GLOVE, POLYISOPRENE AND NEOPRENE BLEND: Brand: GAMMEX

## (undated) DEVICE — 3M™ STERI-STRIP™ REINFORCED ADHESIVE SKIN CLOSURES, R1547, 1/2 IN X 4 IN (12 MM X 100 MM), 6 STRIPS/ENVELOPE: Brand: 3M™ STERI-STRIP™

## (undated) DEVICE — SPONGE 4X4 10PK

## (undated) DEVICE — HOOD: Brand: FLYTE

## (undated) DEVICE — KIT NEG PRSS PREVENA DRAIN 20CM INCIS MGMT PEEL PALACE

## (undated) DEVICE — Device: Brand: STABLECUT®

## (undated) DEVICE — OUTPATIENT: Brand: MEDLINE INDUSTRIES, INC.

## (undated) DEVICE — STERILE LATEX POWDER-FREE SURGICAL GLOVESWITH NITRILE COATING: Brand: PROTEXIS

## (undated) DEVICE — SUT COAT VCRL+ 1 18IN CTX ABSRB VLT ANTIBACT

## (undated) DEVICE — 3M™ STERI-STRIP™ REINFORCED ADHESIVE SKIN CLOSURES, R1548, 1 IN X 5 IN (25 MM X 125 MM), 4 STRIPS/ENVELOPE: Brand: 3M™ STERI-STRIP™

## (undated) DEVICE — GAMMEX® NON-LATEX PI ORTHO SIZE 9, STERILE POLYISOPRENE POWDER-FREE SURGICAL GLOVE: Brand: GAMMEX

## (undated) DEVICE — 2DE14 2-0 PDO 24 X 24: Brand: 2DE14 2-0 PDO 24 X 24

## (undated) DEVICE — E-Z CLEAN, NON-STICK, PTFE COATED, ELECTROSURGICAL BLADE ELECTRODE, 2.75 INCH (7 CM): Brand: MEGADYNE

## (undated) DEVICE — SUCTION CANISTER, 3000CC,SAFELINER: Brand: DEROYAL

## (undated) DEVICE — MINI VERSALIGHT W/ EXT FRAZ TIP 4.5MM

## (undated) DEVICE — KIT EVAC 400CC 3/16IN PVC RND DRN Y CONN

## (undated) DEVICE — GLOVE SUR 7 SENSICARE PI MIC PIP CRM PWD F

## (undated) DEVICE — ANTIBACTERIAL UNDYED BRAIDED (POLYGLACTIN 910), SYNTHETIC ABSORBABLE SUTURE: Brand: COATED VICRYL

## (undated) DEVICE — PACK CDS ANTERIOR HIP

## (undated) DEVICE — Device

## (undated) DEVICE — BIPOLAR SEALER 23-112-1 AQM 6.0: Brand: AQUAMANTYS™

## (undated) DEVICE — Device: Brand: CUSTOM PROCEDURE KIT

## (undated) DEVICE — SOLUTION IRRIG 1000ML 0.9% NACL USP BTL

## (undated) DEVICE — GAMMEX® NON-LATEX PI ORTHO SIZE 8.5, STERILE POLYISOPRENE POWDER-FREE SURGICAL GLOVE: Brand: GAMMEX

## (undated) DEVICE — SOLUTION IV 1000ML 0.9% NACL PRESERVATIVE

## (undated) DEVICE — SHEET,DRAPE,53X77,STERILE: Brand: MEDLINE

## (undated) DEVICE — 3M™ COBAN™ NL STERILE NON-LATEX SELF-ADHERENT WRAP, 2084S, 4 IN X 5 YD (10 CM X 4,5 M), 18 ROLLS/CASE: Brand: 3M™ COBAN™

## (undated) DEVICE — SNARE 9MM 230CM 2.4MM EXACTO

## (undated) DEVICE — APPLICATOR PREP 26ML CHG 2% ISO ALC 70%

## (undated) DEVICE — TRAY CATH FOLEY 16FR INCLUDE BARDX IC COMPLT CARE

## (undated) DEVICE — GAMMEX® PI HYBRID SIZE 9, STERILE POWDER-FREE SURGICAL GLOVE, POLYISOPRENE AND NEOPRENE BLEND: Brand: GAMMEX

## (undated) DEVICE — Device: Brand: DEFENDO AIR/WATER/SUCTION AND BIOPSY VALVE

## (undated) DEVICE — 450 ML BOTTLE OF 0.05% CHLORHEXIDINE GLUCONATE IN 99.95% STERILE WATER FOR IRRIGATION, USP AND APPLICATOR.: Brand: IRRISEPT ANTIMICROBIAL WOUND LAVAGE

## (undated) DEVICE — SUT ETHLN 4-0 18IN FS-2 NABSRB BLK 19MM 3/8 C

## (undated) NOTE — LETTER
[address], [place]  Authorization for Invasive Procedures  1.  I hereby authorize  *** , my physician and whomever may be designated as the doctor's assistant, to perform the following operation and/or procedure:  *COLON performed for the purposes of advancing medicine, science, and/or education, provided my identity is not revealed. If the procedure has been videotaped, the physician/surgeon will obtain the original videotape.  The hospital will not be responsible for stor My signature below affirms that prior to the time of the procedure, I have explained to the patient and/or his legal representative, the risks and benefits involved in the proposed treatment and any reasonable alternative to the proposed treatment.  I have

## (undated) NOTE — LETTER
[facility]  Administration of Anesthesia  1. I, Brendan Luke, or _________________________________ acting on his behalf, (Patient) (Dependent/Representative) request to receive anesthesia for my pending procedure/operation/treatment. infections, high spinal block, spinal bleeding, seizure, cardiac arrest and death. 7. AWARENESS: I understand that it is possible (but unlikely) to have explicit memory of events from the operating room while under general anesthesia.   8. ELECTROCONVULSIV unconscious pt /Relationship    My signature below affirms that prior to the time of the procedure, I have explained to the patient and/or his/her guardian, the risks and benefits of undergoing anesthesia, as well as any reasonable alternatives.     _______

## (undated) NOTE — LETTER
AUTHORIZATION FOR SURGICAL OPERATION OR OTHER PROCEDURE    1.  I hereby authorize Dr. Rex Morales, and [address] [place] [address] staff assigned to my case to perform the following operation and/or procedure at the [address] [place] [address]:    _______Left knee cortisone Injec ________ A. M.  P.M.        Patient Name:  ______________________________________________________  (please print)      Patient signature:  ___________________________________________________             Relationship to Patient:           []  Parent    Respon

## (undated) NOTE — MR AVS SNAPSHOT
[place]  [address]  [phone]               Thank you for choosing us for your health care visit with Shari Cortez MD.  We are glad to serve you and happy to provide you with this montano Call [phone] for help. [employer] is NOT to be used for urgent needs. For medical emergencies, dial 911.            Visit [facility] online at  [email or web address]

## (undated) NOTE — LETTER
AUTHORIZATION FOR SURGICAL OPERATION OR OTHER PROCEDURE    1.  I hereby [facility] Dr. Kg Matias, and [facility] assigned to my case to perform the following operation and/or procedure at the [facility]:    _______________________________ ________ A. M.  P.M.        Patient Name:  ______________________________________________________  (please print)      Patient signature:  ___________________________________________________             Relationship to Patient:           []  Parent    Respon

## (undated) NOTE — LETTER
[address], [place]  Authorization for Invasive Procedures  1.  I hereby authorize Dr. Kera Abdalla** , my physician and whomever may be designated as the doctor's assistant, to perform the following operation and/or procedure:  *COLON performed for the purposes of advancing medicine, science, and/or education, provided my identity is not revealed. If the procedure has been videotaped, the physician/surgeon will obtain the original videotape.  The hospital will not be responsible for stor My signature below affirms that prior to the time of the procedure, I have explained to the patient and/or his legal representative, the risks and benefits involved in the proposed treatment and any reasonable alternative to the proposed treatment.  I have

## (undated) NOTE — LETTER
AUTHORIZATION FOR SURGICAL OPERATION OR OTHER PROCEDURE    1.  I hereby authorize Dr. Opal Vargas, and [address] [place] [address] staff assigned to my case to perform the following operation and/or procedure at the [address] [place] [address]:    _________________________ ________ A. M.  P.M.        Patient Name:  ______________________________________________________  (please print)      Patient signature:  ___________________________________________________             Relationship to Patient:           []  Parent    Respon

## (undated) NOTE — LETTER
[address], [facility]   Date:   1/27/2022     Name:   Kyle Bob    YOB: 1970   MRN:   [id number]       WHERE IS YOUR PAIN NOW?   Tracie Lawrence the areas on your body where you feel the thomas

## (undated) NOTE — ED AVS SNAPSHOT
[facility] in [address] [address] Mark Peoples    Phone:  [phone]    Fax:  Levaramos Greg   MRN: [id number]    Department:  [facility] in [address] [place] [address]   Date of Visit: and physician's office to determine coverage and benefits available for follow-up care and referrals. It is our goal to assure that you are completely satisfied with every aspect of your visit today.   In an effort to constantly improve our service to y Any imaging studies and labs completed today can be reviewed in your MyChart account. You may have had testing done that requires us to contact you. Please make sure we have your correct phone number on file.      OUR CURRENT HOURS OF OPERATION:  MONDAY T If you have questions, you can call [phone] to talk to our [facility] Staff. Remember, [employer] is NOT to be used for urgent needs. For medical emergencies, dial 911. Visit [email or web address] to learn more.

## (undated) NOTE — MR AVS SNAPSHOT
[facility]  [address] [place] [address], [address] [place] [address]  Lexi Lubna               Thank you for choosing us for your health care visit with Yas Busch. Lissett, .   We are glad to serve you and happy to provide you with this summary For medical emergencies, dial 911.            Visit [facility] online at  [email or web address]

## (undated) NOTE — LETTER
Dear Dr. Jony Garcia    This letter is to inform you that Sandrita Arzola has been attending Physical Therapy with me. See below for my most recent plan of care.        Patient Name: Sandrita Arzola, : 1970, MRN: [id number]   Date:

## (undated) NOTE — LETTER
24      Patient: Josiah Baker  : 1970 Visit date: 2024    Dear Javy,    I examined your patient in consultation today.    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  We will plan on excision.    Thank you for your kind referral. If I may answer any questions, please feel free to contact me.     Sincerely,   Brown Cabrera MD     CC: Rikki Galeana DO

## (undated) NOTE — Clinical Note
Dear Abebe Russell,    Thank you for sending Sade Geiger to see me for electrodiagnostic consultation. I appreciate your confidence in me to care for your patients. Please feel free call me with any questions at [phone] or contact me through [address] [place] [address] Liz Vargas

## (undated) NOTE — MR AVS SNAPSHOT
[facility]  [address] [place] [address], [address] [place] [address]  Lazarus Mariela               Thank you for choosing us for your health care visit with Manasa Beckett. DO Lissett.   We are glad to serve you and happy to provide you with this summary For medical emergencies, dial 911.            Visit [facility] online at  [email or web address]

## (undated) NOTE — MR AVS SNAPSHOT
[facility]  [address] [place] [address], [address] [place] [address]  Jose Rebecca               Thank you for choosing us for your health care visit with Kira Hernandez. DO [place].   We are glad to serve you and happy to provide you with this summary you have any questions related to insurance coverage. Thank you.          Referral Details     Referred By    Referred To    Sangita Mims DO   [address] [address]   Phone:  [phone]   Fax:  [phone]    Diagnoses:  Kn recent med list.                clotrimazole-betamethasone 1-0.05 % Crea   MARGARITA BID TO LOWER LEG   Commonly known as:  LOTRISONE           MULTIVITAMINS OR   Take  by mouth.            Sildenafil Citrate 100 MG Tabs   Take 1 tablet (100 mg total) by mouth as

## (undated) NOTE — MR AVS SNAPSHOT
[facility]  [address] [place] [address], [address] [place] [address]               Thank you for choosing us for your health care visit with Adilia Guillen. DO [place].   We are glad to serve you and happy to provide you with this summary Call [phone] for help. Chemclinhart is NOT to be used for urgent needs. For medical emergencies, dial 911.            Visit [facility] online at  [email or web address]